# Patient Record
Sex: FEMALE | Race: WHITE | NOT HISPANIC OR LATINO | Employment: FULL TIME | URBAN - METROPOLITAN AREA
[De-identification: names, ages, dates, MRNs, and addresses within clinical notes are randomized per-mention and may not be internally consistent; named-entity substitution may affect disease eponyms.]

---

## 2020-07-10 ENCOUNTER — LAB VISIT (OUTPATIENT)
Dept: PRIMARY CARE CLINIC | Facility: OTHER | Age: 30
End: 2020-07-10
Attending: INTERNAL MEDICINE
Payer: MEDICAID

## 2020-07-10 DIAGNOSIS — Z03.818 ENCOUNTER FOR OBSERVATION FOR SUSPECTED EXPOSURE TO OTHER BIOLOGICAL AGENTS RULED OUT: ICD-10-CM

## 2020-07-10 DIAGNOSIS — R51.9 HEAD ACHE: ICD-10-CM

## 2020-07-10 PROCEDURE — U0003 INFECTIOUS AGENT DETECTION BY NUCLEIC ACID (DNA OR RNA); SEVERE ACUTE RESPIRATORY SYNDROME CORONAVIRUS 2 (SARS-COV-2) (CORONAVIRUS DISEASE [COVID-19]), AMPLIFIED PROBE TECHNIQUE, MAKING USE OF HIGH THROUGHPUT TECHNOLOGIES AS DESCRIBED BY CMS-2020-01-R: HCPCS

## 2020-07-13 LAB — SARS-COV-2 RNA RESP QL NAA+PROBE: NOT DETECTED

## 2020-09-02 ENCOUNTER — OFFICE VISIT (OUTPATIENT)
Dept: URGENT CARE | Facility: CLINIC | Age: 30
End: 2020-09-02
Payer: MEDICAID

## 2020-09-02 VITALS
TEMPERATURE: 97 F | RESPIRATION RATE: 16 BRPM | BODY MASS INDEX: 24.63 KG/M2 | SYSTOLIC BLOOD PRESSURE: 108 MMHG | DIASTOLIC BLOOD PRESSURE: 76 MMHG | OXYGEN SATURATION: 99 % | WEIGHT: 139 LBS | HEIGHT: 63 IN | HEART RATE: 79 BPM

## 2020-09-02 DIAGNOSIS — R05.9 COUGH: Primary | ICD-10-CM

## 2020-09-02 LAB
CTP QC/QA: YES
SARS-COV-2 RDRP RESP QL NAA+PROBE: NEGATIVE

## 2020-09-02 PROCEDURE — 99203 PR OFFICE/OUTPT VISIT, NEW, LEVL III, 30-44 MIN: ICD-10-PCS | Mod: S$GLB,,, | Performed by: FAMILY MEDICINE

## 2020-09-02 PROCEDURE — U0002: ICD-10-PCS | Mod: QW,S$GLB,, | Performed by: FAMILY MEDICINE

## 2020-09-02 PROCEDURE — 99203 OFFICE O/P NEW LOW 30 MIN: CPT | Mod: S$GLB,,, | Performed by: FAMILY MEDICINE

## 2020-09-02 PROCEDURE — U0002 COVID-19 LAB TEST NON-CDC: HCPCS | Mod: QW,S$GLB,, | Performed by: FAMILY MEDICINE

## 2020-09-02 NOTE — PROGRESS NOTES
"Subjective:       Patient ID: Estrellita Garcia is a 29 y.o. female.    Vitals:  height is 5' 3" (1.6 m) and weight is 63 kg (139 lb). Her oral temperature is 97.4 °F (36.3 °C). Her blood pressure is 108/76 and her pulse is 79. Her respiration is 16 and oxygen saturation is 99%.     Chief Complaint: Sinus Problem    Headaches, chest congestion, weird taste in her mouth, and fatigue for 1 week. Not taking OTC meds.    Sinus Problem  This is a new problem. The current episode started in the past 7 days. There has been no fever. She is experiencing no pain. Associated symptoms include congestion, headaches, sinus pressure and sneezing. Pertinent negatives include no chills, coughing, diaphoresis, ear pain, hoarse voice, neck pain, shortness of breath, sore throat or swollen glands. Past treatments include nothing. The treatment provided no relief.       Constitution: Negative for chills, sweating, fatigue and fever.   HENT: Positive for congestion and sinus pressure. Negative for ear pain and sore throat.    Neck: Negative for neck pain and painful lymph nodes.   Cardiovascular: Negative for chest pain and leg swelling.   Eyes: Negative for double vision and blurred vision.   Respiratory: Negative for cough and shortness of breath.    Gastrointestinal: Negative for nausea, vomiting and diarrhea.   Genitourinary: Negative for dysuria, frequency, urgency and history of kidney stones.   Musculoskeletal: Negative for joint pain, joint swelling, muscle cramps and muscle ache.   Skin: Negative for color change, pale, rash and bruising.   Allergic/Immunologic: Positive for sneezing. Negative for seasonal allergies.   Neurological: Positive for headaches. Negative for dizziness, history of vertigo, light-headedness and passing out.   Hematologic/Lymphatic: Negative for swollen lymph nodes.   Psychiatric/Behavioral: Negative for nervous/anxious, sleep disturbance and depression. The patient is not nervous/anxious.        Objective: "      Physical Exam   Constitutional: She is oriented to person, place, and time. She appears well-developed.   HENT:   Head: Normocephalic and atraumatic.   Ears:   Right Ear: External ear normal.   Left Ear: External ear normal.   Nose: Nose normal.   Mouth/Throat: Oropharynx is clear and moist.   Eyes: Pupils are equal, round, and reactive to light. Conjunctivae, EOM and lids are normal.   Neck: Trachea normal, full passive range of motion without pain and phonation normal. Neck supple.   Cardiovascular: Normal rate.   Pulmonary/Chest: Effort normal and breath sounds normal.   Musculoskeletal: Normal range of motion.   Neurological: She is alert and oriented to person, place, and time.   Skin: Skin is warm, dry and intact. Psychiatric: Her speech is normal and behavior is normal. Judgment and thought content normal.   Nursing note and vitals reviewed.        Assessment:       1. Cough        Plan:         Cough  -     POCT COVID-19 Rapid Screening

## 2020-09-02 NOTE — LETTER
September 2, 2020      [facility] [address], [address]  [facility] [address]  Phone: [phone]  Fax: [phone]       Patient: Estrellita Garcia   YOB: 1990  Date of Visit: 09/02/2020    To Whom It May Concern:    Vijay Garcia  was at [facility] on 09/02/2020.Please allow to work from home until 9/11, unless estrellita is feeling better sooner. If you have any questions or concerns, or if I can be of further assistance, please do not hesitate to contact me.    Sincerely,    Floyd Crespo MD

## 2021-01-23 ENCOUNTER — OFFICE VISIT (OUTPATIENT)
Dept: URGENT CARE | Facility: CLINIC | Age: 31
End: 2021-01-23
Payer: MEDICAID

## 2021-01-23 VITALS
SYSTOLIC BLOOD PRESSURE: 113 MMHG | HEIGHT: 65 IN | OXYGEN SATURATION: 96 % | TEMPERATURE: 99 F | RESPIRATION RATE: 17 BRPM | HEART RATE: 78 BPM | WEIGHT: 162 LBS | BODY MASS INDEX: 26.99 KG/M2 | DIASTOLIC BLOOD PRESSURE: 73 MMHG

## 2021-01-23 DIAGNOSIS — R05.9 COUGH: Primary | ICD-10-CM

## 2021-01-23 LAB
CTP QC/QA: YES
SARS-COV-2 RDRP RESP QL NAA+PROBE: NEGATIVE

## 2021-01-23 PROCEDURE — U0002: ICD-10-PCS | Mod: QW,S$GLB,, | Performed by: FAMILY MEDICINE

## 2021-01-23 PROCEDURE — 99214 PR OFFICE/OUTPT VISIT, EST, LEVL IV, 30-39 MIN: ICD-10-PCS | Mod: S$GLB,,, | Performed by: FAMILY MEDICINE

## 2021-01-23 PROCEDURE — U0002 COVID-19 LAB TEST NON-CDC: HCPCS | Mod: QW,S$GLB,, | Performed by: FAMILY MEDICINE

## 2021-01-23 PROCEDURE — 99214 OFFICE O/P EST MOD 30 MIN: CPT | Mod: S$GLB,,, | Performed by: FAMILY MEDICINE

## 2022-01-06 ENCOUNTER — OFFICE VISIT (OUTPATIENT)
Dept: URGENT CARE | Facility: CLINIC | Age: 32
End: 2022-01-06
Payer: MEDICAID

## 2022-01-06 VITALS
SYSTOLIC BLOOD PRESSURE: 108 MMHG | TEMPERATURE: 98 F | HEIGHT: 65 IN | OXYGEN SATURATION: 97 % | RESPIRATION RATE: 16 BRPM | BODY MASS INDEX: 29.99 KG/M2 | WEIGHT: 180 LBS | HEART RATE: 86 BPM | DIASTOLIC BLOOD PRESSURE: 75 MMHG

## 2022-01-06 DIAGNOSIS — R05.9 COUGH: Primary | ICD-10-CM

## 2022-01-06 DIAGNOSIS — U07.1 COVID-19: ICD-10-CM

## 2022-01-06 LAB
CTP QC/QA: YES
SARS-COV-2 RDRP RESP QL NAA+PROBE: POSITIVE

## 2022-01-06 PROCEDURE — 1159F PR MEDICATION LIST DOCUMENTED IN MEDICAL RECORD: ICD-10-PCS | Mod: CPTII,S$GLB,, | Performed by: EMERGENCY MEDICINE

## 2022-01-06 PROCEDURE — 3074F PR MOST RECENT SYSTOLIC BLOOD PRESSURE < 130 MM HG: ICD-10-PCS | Mod: CPTII,S$GLB,, | Performed by: EMERGENCY MEDICINE

## 2022-01-06 PROCEDURE — 3008F PR BODY MASS INDEX (BMI) DOCUMENTED: ICD-10-PCS | Mod: CPTII,S$GLB,, | Performed by: EMERGENCY MEDICINE

## 2022-01-06 PROCEDURE — 99214 PR OFFICE/OUTPT VISIT, EST, LEVL IV, 30-39 MIN: ICD-10-PCS | Mod: S$GLB,,, | Performed by: EMERGENCY MEDICINE

## 2022-01-06 PROCEDURE — 1160F RVW MEDS BY RX/DR IN RCRD: CPT | Mod: CPTII,S$GLB,, | Performed by: EMERGENCY MEDICINE

## 2022-01-06 PROCEDURE — U0002 COVID-19 LAB TEST NON-CDC: HCPCS | Mod: QW,CR,S$GLB, | Performed by: EMERGENCY MEDICINE

## 2022-01-06 PROCEDURE — 99214 OFFICE O/P EST MOD 30 MIN: CPT | Mod: S$GLB,,, | Performed by: EMERGENCY MEDICINE

## 2022-01-06 PROCEDURE — 3008F BODY MASS INDEX DOCD: CPT | Mod: CPTII,S$GLB,, | Performed by: EMERGENCY MEDICINE

## 2022-01-06 PROCEDURE — 3074F SYST BP LT 130 MM HG: CPT | Mod: CPTII,S$GLB,, | Performed by: EMERGENCY MEDICINE

## 2022-01-06 PROCEDURE — U0002: ICD-10-PCS | Mod: QW,CR,S$GLB, | Performed by: EMERGENCY MEDICINE

## 2022-01-06 PROCEDURE — 3078F PR MOST RECENT DIASTOLIC BLOOD PRESSURE < 80 MM HG: ICD-10-PCS | Mod: CPTII,S$GLB,, | Performed by: EMERGENCY MEDICINE

## 2022-01-06 PROCEDURE — 1159F MED LIST DOCD IN RCRD: CPT | Mod: CPTII,S$GLB,, | Performed by: EMERGENCY MEDICINE

## 2022-01-06 PROCEDURE — 3078F DIAST BP <80 MM HG: CPT | Mod: CPTII,S$GLB,, | Performed by: EMERGENCY MEDICINE

## 2022-01-06 PROCEDURE — 1160F PR REVIEW ALL MEDS BY PRESCRIBER/CLIN PHARMACIST DOCUMENTED: ICD-10-PCS | Mod: CPTII,S$GLB,, | Performed by: EMERGENCY MEDICINE

## 2022-01-06 RX ORDER — ALBUTEROL SULFATE 90 UG/1
2 AEROSOL, METERED RESPIRATORY (INHALATION) EVERY 6 HOURS PRN
Qty: 18 G | Refills: 0 | Status: SHIPPED | OUTPATIENT
Start: 2022-01-06 | End: 2022-09-12

## 2022-01-06 RX ORDER — ESCITALOPRAM OXALATE 10 MG/1
10 TABLET ORAL DAILY
COMMUNITY
End: 2022-09-12

## 2022-01-06 RX ORDER — ONDANSETRON 4 MG/1
4 TABLET, ORALLY DISINTEGRATING ORAL EVERY 6 HOURS PRN
Qty: 12 TABLET | Refills: 0 | Status: SHIPPED | OUTPATIENT
Start: 2022-01-06 | End: 2022-09-12

## 2022-01-06 RX ORDER — PROMETHAZINE HYDROCHLORIDE AND DEXTROMETHORPHAN HYDROBROMIDE 6.25; 15 MG/5ML; MG/5ML
5 SYRUP ORAL EVERY 4 HOURS PRN
Qty: 180 ML | Refills: 0 | Status: SHIPPED | OUTPATIENT
Start: 2022-01-06 | End: 2022-01-16

## 2022-01-06 NOTE — PROGRESS NOTES
"Subjective:       Patient ID: Estrellita Garcia is a 31 y.o. female.    Vitals:  height is 5' 5" (1.651 m) and weight is 81.6 kg (180 lb). Her temperature is 97.9 °F (36.6 °C). Her blood pressure is 108/75 and her pulse is 86. Her respiration is 16 and oxygen saturation is 97%.     Chief Complaint: Sore Throat    Patient presents today with complaints of Sore Throat, Fever 101.5 oral & productive cough with brown/yellow sputrum   X 4 days.  Patient has taken OTC tylenol for fever with moderate relief. Patient is covid vaccinated.     Sore Throat   This is a new problem. The current episode started in the past 7 days. The problem has been unchanged. The maximum temperature recorded prior to her arrival was 101 - 101.9 F. Associated symptoms include coughing.       HENT: Positive for sore throat.    Respiratory: Positive for cough.        Objective:      Physical Exam   Constitutional: She is oriented to person, place, and time. She appears well-developed and well-nourished. She is cooperative.  Non-toxic appearance. She does not have a sickly appearance. She does not appear ill. No distress.   HENT:   Head: Normocephalic and atraumatic.   Ears:   Right Ear: Hearing and external ear normal.   Left Ear: Hearing and external ear normal.   Nose: Nose normal. No mucosal edema, rhinorrhea or nasal deformity. No epistaxis. Right sinus exhibits no maxillary sinus tenderness and no frontal sinus tenderness. Left sinus exhibits no maxillary sinus tenderness and no frontal sinus tenderness.   Mouth/Throat: Uvula is midline, oropharynx is clear and moist and mucous membranes are normal. No trismus in the jaw. Normal dentition. No uvula swelling. No oropharyngeal exudate, posterior oropharyngeal edema or posterior oropharyngeal erythema.   Eyes: Conjunctivae and lids are normal. No scleral icterus.   Neck: Trachea normal and phonation normal. Neck supple. No edema present. No erythema present. No neck rigidity present. "   Cardiovascular: Normal rate, regular rhythm, normal heart sounds, intact distal pulses and normal pulses.   Pulmonary/Chest: Effort normal and breath sounds normal. No respiratory distress. She has no decreased breath sounds. She has no rhonchi.    Comments: Very minimal expiratory wheeze with good air movement    Abdominal: Normal appearance.   Musculoskeletal: Normal range of motion.         General: No deformity or edema. Normal range of motion.   Neurological: She is alert and oriented to person, place, and time. She exhibits normal muscle tone. Coordination normal.   Skin: Skin is warm, dry, intact, not diaphoretic and not pale.   Psychiatric: She has a normal mood and affect. Her speech is normal and behavior is normal. Judgment and thought content normal. Cognition and memory  Nursing note and vitals reviewed.    Results for orders placed or performed in visit on 01/06/22   POCT COVID-19 Rapid Screening   Result Value Ref Range    POC Rapid COVID Positive (A) Negative     Acceptable Yes                Assessment:       1. Cough    2. COVID-19          Plan:         Cough  -     POCT COVID-19 Rapid Screening    COVID-19  -     albuterol (VENTOLIN HFA) 90 mcg/actuation inhaler; Inhale 2 puffs into the lungs every 6 (six) hours as needed for Wheezing. Rescue  Dispense: 18 g; Refill: 0  -     ondansetron (ZOFRAN-ODT) 4 MG TbDL; Take 1 tablet (4 mg total) by mouth every 6 (six) hours as needed (Nausea).  Dispense: 12 tablet; Refill: 0    Other orders  -     promethazine-dextromethorphan (PROMETHAZINE-DM) 6.25-15 mg/5 mL Syrp; Take 5 mLs by mouth every 4 (four) hours as needed (cough).  Dispense: 180 mL; Refill: 0

## 2022-01-06 NOTE — PATIENT INSTRUCTIONS
"You have tested positive for COVID-19 today.           ISOLATION    If you tested positive and you have no symptoms, you must isolate for 5 days starting on the day of the positive test. I         If you tested positive and have symptoms, you must isolate for 5 days starting on the day of the first symptoms,  not the day of the positive test.     This is the most important part, both the CDC and the [facility] emphasize that you do not test out of isolation.     After 5 days, if your symptoms have improved and you have not had fever on day 5, you can return to the community on day 6- NO TESTING REQUIRED!          In fact, we do not retest if you were positive in the last 90 days.       After your 5 days of isolation are completed, the CDC recommends strict mask use for the first 5 days that you come out of isolation.    Patient encouraged to see a provider today for further assessment or treatment options. "Patient Declined/Patient would like a phone call for following"  Patient will/will not be contacted by Provider for following information below   Your test is positive, scientists at [facility] would like to collect another sample from you to SEQUENCE for one of the COVID variants. We wont be able to give you the results, but it will help researchers understand whether vaccines are effective against new strains.     Patient will/will not be contacted by Provider Monoclonal antibody infusion therapy         CDC Testing and Quarantine Guidelines for patients with exposure to a known-positive COVID-19 person:    ·     A 'close exposure' is defined as anyone who has had an exposure (masked or unmasked) to a known COVID -19 positive person            within 6 feet of someone            for a cumulative total of 15 minutes or more over a 24-hour period.    ·     vaccinated and/or had a positive test within 90 days            do NOT need to quarantine after contact with someone who had COVID-19 unless they have " symptoms.            fully vaccinated people who have not had a positive test within 90 days, should get tested 3-5 days after their exposure, even if they don't have symptoms and wear a mask indoors in public for 10 days following exposure or until their test result is negative.         ·     Unvaccinated, or are more than six months out from their second mRNA dose (or more than 2 months after the J&J vaccine) and not yet boosted,  and/or NOT had a positive test within 90 days and meet 'close exposure'    you are required by CDC guidelines to quarantine for at least 5 days from time of exposure followed by 5 days of strict masking. It is recommended, but not required to test after 5 days, unless you develop symptoms, in which case you should test at that time.    If you do decide to test at 5 days and are asymptomatic, the risk is that if you test without symptoms on Day 5 for example) and you are positive, your 5 day isolation begins on that day, and you turned your 5 day quarantine into 10 days.            If your exposure does not meet the above definition, you can return to your normal daily activities to include social distancing, wearing a mask and frequent handwashing.    Alternatively, if a 5-day quarantine is not feasible, it is imperative that an exposed person wear a well-fitting mask at all times when around others for 10 days after exposure  Patient Education       COVID-19 Discharge Instructions   About this topic   Coronavirus disease 2019 is also known as COVID-19. It is a viral illness that infects the lungs. It is caused by a virus called SARS-associated coronavirus (SARS-CoV-2).  The signs of COVID-19 most often start a few days after you have been infected. In some people, it takes longer to show signs. Others never show signs of the infection. You may have a cough, fever, shaking chills and it may be hard to breathe. You may be very tired, have muscle aches, a headache or sore throat. Some  people have an upset stomach or loose stools. Others lose their sense of smell or taste. You may not have these signs all the time and they may come and go while you are sick.  The virus spreads easily through droplets when you talk, sneeze, or cough. You can pass the virus to others when you are talking close together, singing, hugging, sharing food, or shaking hands. Doctors believe the germs also survive on surfaces like tables, door handles, and telephones. However, this is not a common way that COVID-19 spreads. Doctors believe you can also spread the infection even if you dont have any symptoms, but they do not know how that happens. This is why getting vaccinated is one of the best ways to keep you healthy and slow the spread of the virus.  Some people have a mild case of COVID-19 and are able to stay at home and away from others until they feel better. Others may need to be in the hospital if they are very sick. Some people with COVID-19 can have some symptoms for weeks or months. People with COVID-19 must isolate themselves. You can start to be around others when your doctor says it is safe to do so.       What care is needed at home?   · Ask your doctor what you need to do when you go home. Make sure you ask questions if you do not understand what the doctor says.  · Drink lots of water, juice, or broth to replace fluids lost from a fever.  · You may use cool mist humidifiers to help ease congestion and coughing.  · Use 2 to 3 pillows to prop yourself up when you lie down to make it easier to breathe and sleep.  · Do not smoke and do not drink beer, wine, and mixed drinks (alcohol).  · To lower the chance of passing the infection to others, get a COVID-19 vaccine after your infection has resolved.  · If you have not been fully vaccinated:  ? Wear a mask over your mouth and nose if you are around others who are not sick. Cloth masks work best if they have more than one layer of fabric.  ? Wash your hands  often.  ? Stay home in a separate room, if possible, away from others. Only go out to get medical care.  ? Use a separate bathroom if possible.  ? Do not make food for others.  What follow-up care is needed?   · Your doctor may ask you to make visits to the office to check on your progress. Be sure to keep these visits. Make sure you wear a mask at these visits.  · If you can, tell the staff you have COVID-19 ahead of time so they can take extra care to stop the disease from spreading.  · It may take a few weeks before your health returns to normal.  What drugs may be needed?   The doctor may order drugs to:  · Help with breathing  · Help with fever  · Help with swelling in your airways and lungs  · Control coughing  · Ease a sore throat  · Help a runny or stuffy nose  Will physical activity be limited?   You may have to limit your physical activity. Talk to your doctor about the right amount of activity for you. If you have been very sick with COVID-19, it can take some time to get your strength back.  Will there be any other care needed?   Doctors do not know how long you can pass the virus on to others after you are sick. This is why it is important to stay in a separate room, if possible, when you are sick. For now, doctors are giving general guidelines for you to follow after you have been sick. Before you go around other people, you should:  · Be fever free for 24 hours without taking any drugs to lower the fever  · Have no symptoms of cough or shortness of breath  · Wait at least 10 days after first having symptoms or your first positive test, and you need to be symptom free as above. Some experts suggest waiting 20 days if you have had a more severe infection.  Talk with your doctor about getting a COVID-19 vaccine.  What problems could happen?   · Fluid loss. This is dehydration.  · Short-term or long-term lung damage  · Heart problems  · Death  When do I need to call the doctor?   · You are having so much  trouble breathing that you can only say one or two words at a time.  · You need to sit upright at all times to be able to breathe and/or cannot lie down.  · You are very confused or cannot stay awake.  · Your lips or skin start to turn blue or grey.  · You think you might be having a medical emergency. Some examples of medical emergencies are:  ? Severe chest pain.  ? Not able to speak or move normally.  · You have trouble breathing when talking or sitting still.  · You have new shortness of breath.  · You become weak or dizzy.  · You have very dark urine or do not pass urine for more than 8 hours.  · You have new or worsening COVID-19 symptoms like:  ? Fever  ? Cough  ? Feeling very tired  ? Shaking chills  ? Headache  ? Trouble swallowing  ? Throwing up  ? Loose stools  ? Reddish purple spots on your fingers or toes  Teach Back: Helping You Understand   The Teach Back Method helps you understand the information we are giving you. After you talk with the staff, tell them in your own words what you learned. This helps to make sure the staff has described each thing clearly. It also helps to explain things that may have been confusing. Before going home, make sure you can do these:  · I can tell you about my condition.  · I can tell you what may help ease my breathing.  · I can tell you what I can do to help avoid passing the infection to others.  · I can tell you what I will do if I have trouble breathing; feel sleepy or confused; or my fingertips, fingernails, skin, or lips are blue.  Where can I learn more?   Centers for Disease Control and Prevention  https://www.cdc.gov/coronavirus/2019-ncov/about/index.html   Centers for Disease Control and Prevention  https://www.cdc.gov/coronavirus/2019-ncov/hcp/disposition-in-home-patients.html   World Health Organization  https://www.who.int/news-room/q-a-detail/d-b-mxrnrvvqwxtas   Last Reviewed Date   2021-10-05  Consumer Information Use and Disclaimer   This information  is not specific medical advice and does not replace information you receive from your health care provider. This is only a brief summary of general information. It does NOT include all information about conditions, illnesses, injuries, tests, procedures, treatments, therapies, discharge instructions or life-style choices that may apply to you. You must talk with your health care provider for complete information about your health and treatment options. This information should not be used to decide whether or not to accept your health care providers advice, instructions or recommendations. Only your health care provider has the knowledge and training to provide advice that is right for you.  Copyright   Copyright © 2021 [employer] and its affiliates and/or licensors. All rights reserved.

## 2023-02-20 ENCOUNTER — NURSE TRIAGE (OUTPATIENT)
Dept: ADMINISTRATIVE | Facility: CLINIC | Age: 33
End: 2023-02-20
Payer: MEDICAID

## 2023-02-20 NOTE — TELEPHONE ENCOUNTER
Patient is calling, was seen in ED for fracture of ankle. [facility] was given referral to ortho but they do not take medicaid. She spoke to [facility] Medicaid office and [facility] was given a list to call and no one will except Medicaid. Discussed option of calling [facility]. Verb understanding. Also states pain medication is no working, advised to return to ED if no pain relief. No further questions or concerns at this time.   Reason for Disposition   Caller requesting an appointment, triage offered and declined    Protocols used: PCP Call - No Triage-A-

## 2023-02-21 ENCOUNTER — NURSE TRIAGE (OUTPATIENT)
Dept: ADMINISTRATIVE | Facility: CLINIC | Age: 33
End: 2023-02-21
Payer: MEDICAID

## 2023-02-21 NOTE — TELEPHONE ENCOUNTER
[address], [address]  [facility] [address]  Phone: [phone]  Fax: [phone]    January 9, 2019        Dago Dangelo  [address] [facility] [address]          To whom it may concern:    RE: Dago Dangelo    Patient was seen and treated today at our clinic.  Please excuse him from work on 1/8-1/10/2019.          Sincerely,        OCTAVIO Lockwood CNP   Estrellita calling states she broke both sides of her ankle over the weekend. Splint to RLE and on crutches. states pain is severe & now having pain in mid calf under splint. States no relief after taking prescribed Hydrocodone and  mg as instructed. Advised per triage protocol to go to nearest ED within next 4 hours for physician claudette. V/u.   Reason for Disposition   [1] SEVERE pain AND [2] not improved one hour after pain medicine, elevation, and loosening elastic bandage or Velcro    Additional Information   Negative: Chest pain   Negative: Difficulty breathing   Negative: [1] Numbness (loss of sensation) of fingers or toes AND [2] persists > 1 hour after loosening elastic bandage or Velcro   Negative: [1] Tingling (pins and needles sensation) of fingers or toes AND [2] persists > 1 hour after loosening elastic bandage or Velcro   Negative: [1] Blueness or pallor of fingers or toes (compared to noninjured side) AND [2] persists > 1 hour after loosening elastic bandage or Velcro   Negative: Patient sounds very sick or weak to the triager    Protocols used: Splint Symptoms and [id number]

## 2023-02-22 ENCOUNTER — OFFICE VISIT (OUTPATIENT)
Dept: ORTHOPEDICS | Facility: CLINIC | Age: 33
End: 2023-02-22
Payer: MEDICAID

## 2023-02-22 ENCOUNTER — HOSPITAL ENCOUNTER (OUTPATIENT)
Dept: RADIOLOGY | Facility: HOSPITAL | Age: 33
Discharge: HOME | End: 2023-02-22
Attending: ORTHOPAEDIC SURGERY
Payer: MEDICAID

## 2023-02-22 ENCOUNTER — TELEPHONE (OUTPATIENT)
Dept: ORTHOPEDICS | Facility: CLINIC | Age: 33
End: 2023-02-22
Payer: MEDICAID

## 2023-02-22 DIAGNOSIS — S93.04XA ANKLE DISLOCATION, RIGHT, INITIAL ENCOUNTER: ICD-10-CM

## 2023-02-22 DIAGNOSIS — S82.851A DISPLACED TRIMALLEOLAR FRACTURE OF LOWER LEG, RIGHT, CLOSED, INITIAL ENCOUNTER: ICD-10-CM

## 2023-02-22 DIAGNOSIS — M25.571 RIGHT ANKLE PAIN, UNSPECIFIED CHRONICITY: ICD-10-CM

## 2023-02-22 DIAGNOSIS — S82.851A CLOSED DISPLACED TRIMALLEOLAR FRACTURE OF RIGHT ANKLE, INITIAL ENCOUNTER: Primary | ICD-10-CM

## 2023-02-22 DIAGNOSIS — M25.571 RIGHT ANKLE PAIN, UNSPECIFIED CHRONICITY: Primary | ICD-10-CM

## 2023-02-22 PROCEDURE — 99213 OFFICE O/P EST LOW 20 MIN: CPT | Mod: PBBFAC,PN | Performed by: ORTHOPAEDIC SURGERY

## 2023-02-22 PROCEDURE — 73610 X-RAY EXAM OF ANKLE: CPT | Mod: TC,PO,RT

## 2023-02-22 PROCEDURE — 99999 PR PBB SHADOW E&M-EST. PATIENT-LVL III: CPT | Mod: PBBFAC,,, | Performed by: ORTHOPAEDIC SURGERY

## 2023-02-22 PROCEDURE — 99999 PR PBB SHADOW E&M-EST. PATIENT-LVL III: ICD-10-PCS | Mod: PBBFAC,,, | Performed by: ORTHOPAEDIC SURGERY

## 2023-02-22 PROCEDURE — 99203 OFFICE O/P NEW LOW 30 MIN: CPT | Mod: S$PBB,57,, | Performed by: ORTHOPAEDIC SURGERY

## 2023-02-22 PROCEDURE — 99203 PR OFFICE/OUTPT VISIT, NEW, LEVL III, 30-44 MIN: ICD-10-PCS | Mod: S$PBB,57,, | Performed by: ORTHOPAEDIC SURGERY

## 2023-02-22 PROCEDURE — 73610 X-RAY EXAM OF ANKLE: CPT | Mod: 26,RT,, | Performed by: RADIOLOGY

## 2023-02-22 PROCEDURE — 73610 XR ANKLE COMPLETE 3 VIEW RIGHT: ICD-10-PCS | Mod: 26,RT,, | Performed by: RADIOLOGY

## 2023-02-22 RX ORDER — ALPRAZOLAM 0.25 MG/1
0.25 TABLET ORAL DAILY PRN
COMMUNITY
Start: 2023-01-26

## 2023-02-22 RX ORDER — ESCITALOPRAM OXALATE 20 MG/1
20 TABLET ORAL NIGHTLY
COMMUNITY

## 2023-02-22 RX ORDER — DEXTROAMPHETAMINE SACCHARATE, AMPHETAMINE ASPARTATE, DEXTROAMPHETAMINE SULFATE AND AMPHETAMINE SULFATE 3.75; 3.75; 3.75; 3.75 MG/1; MG/1; MG/1; MG/1
15 TABLET ORAL 2 TIMES DAILY
COMMUNITY
Start: 2023-01-26

## 2023-02-22 NOTE — PROGRESS NOTES
Status/Diagnosis: Right trimalleolar ankle fracture dislocation  Date of Surgery: none  Date of Injury: 02/19/2023  Return visit: 02/28/2023  X-rays on Return: NWB 3-views Right ankle (IN plaster)    Chief Complaint:   Chief Complaint   Patient presents with    Right Ankle - Pain     Present History:  Estrellita Garcia is a 32 y.o. female who presents today with her mother for new patient evaluation.  Reports she got home from the bar early Sunday morning when she rolled her right ankle and sustained a fall.  Immediate pain, swelling, inability bear weight.  Subsequently seen at [facility] ED at which time x-rays were taken and consistent with fracture.  She was placed into a short-leg splint with instructions for nonweightbearing.  Reports she actually was seen at a local urgent care the following day due to increased pain/tightness.  Reports that her splint was exchanged with moderate symptomatic improvement.  Endorses 10/10 pain on presentation today.  Denies any ankle pain or instability prior to injury.  Denies any numbness or tingling.    Vague history of heart arrhythmia, on atenolol.  Managed by Dr. Holcomb.  Vapes regularly with nicotine.  Works from home.      No past medical history on file.    Past Surgical History:   Procedure Laterality Date    uterine polyps         Current Outpatient Medications   Medication Sig    ALPRAZolam (XANAX) 0.25 MG tablet Take 0.25 mg by mouth daily as needed.    atenoloL (TENORMIN) 25 MG tablet Take 1 tablet (25 mg total) by mouth once daily.    dextroamphetamine-amphetamine (ADDERALL) 15 mg tablet Take 15 mg by mouth 2 (two) times daily.    EScitalopram oxalate (LEXAPRO) 20 MG tablet Take 20 mg by mouth once daily. Takes 30mg    HYDROcodone-acetaminophen (NORCO) 7.5-325 mg per tablet Take 1 tablet by mouth every 8 (eight) hours as needed for Pain.     No current facility-administered medications for this visit.       Review of patient's allergies indicates:  No Known  Allergies    Family History   Problem Relation Age of Onset    No Known Problems Mother     No Known Problems Father        Social History     Socioeconomic History    Marital status: Single   Tobacco Use    Smoking status: Some Days     Types: Cigarettes    Smokeless tobacco: Never   Substance and Sexual Activity    Alcohol use: Yes     Comment: socially    Drug use: Never    Sexual activity: Yes       Physical exam:  There were no vitals filed for this visit.  There is no height or weight on file to calculate BMI.  General: In no apparent distress; well developed and well nourished.  HEENT: normocephalic; atraumatic.  Cardiovascular: regular rate.  Respiratory: no increased work of breathing.  Musculoskeletal:   Gait: unable to assess  Inspection:  Diffuse swelling about the right ankle.  Superficial abrasion involving the anteromedial aspect of the ankle measuring approximately 1.5 x 3 cm.  No deep extension.  No drainage.  There are 2 small areas of fracture blistering on the posterior margin of the superficial abrasion.  Diffusely tender to palpation.  For ankle range of motion secondary to pain.  No pain referable to the foot.  No pain/laxity with Lisfranc stress.  Silfverskiold: unable to assess  Alignment:  Knee: neutral               Ankle: neutral              Hindfoot: neutral              Forefoot: neutral   Strength:              Dorsiflexion 5/5  Plantar flexion 5/5  Inversion 5/5   Eversion 5/5   Sensation:              SILT distally   Pulses: 2+ DP/PT pulses.                   Imaging Studies/Outside documentation:  I have ordered/reviewed/interpreted the following images/outside documentation:  1. NWB 3-views Right ankle:  Significantly displaced trimalleolar ankle fracture with lateral subluxation of the talus relative to the tibial plafond.  Clear space widening present medially. Long spiral fracture of the lateral malleolus.  Comminuted medial malleolus fracture.  Avulsion of the posterior  malleolus.  No significant degenerative joint changes.        Assessment:  Estrellita Garcia is a 32 y.o. female with Right trimalleolar ankle fracture dislocation.     Plan:   Clinical and radiographic findings were discussed. Operative vs nonoperative treatment options were described. These include but are not limited to bleeding; infection; damage to surrounding nerves or vessels; persistent pain, stiffness; nonunion; malunion; recurrent deformity; need for additional procedures; amputation; blood clots; pulmonary embolus; cardiac events; stroke; and the general risks of anesthesia including anesthetic death.   We also reviewed postop protocol as well as limitations and expectations.   Specifically discussed with the patient and her mother the need for additional procedures.  This would be stage one of a two-stage process.  With need for definitive ORIF once soft tissue swelling allows.  Patient understands and desires to proceed with surgical intervention.   Explained risks, benefits, and alternative to the patient. Asked if any questions--none.  Surgery to include but not limited to:   Right ankle closed reduction.  Application of short-leg splint versus ankle spanning external fixator placement; surgery as indicated.        A Betadine dressing was placed in the area of superficial abrasion as outlined above.  A new well-padded short-leg plaster splint was placed.  Patient given instructions for strict elevation.  To OR tomorrow morning for the above noted procedure.    This note was created using voice recognition software and may contain grammatical errors.

## 2023-02-22 NOTE — H&P (VIEW-ONLY)
Status/Diagnosis: Right trimalleolar ankle fracture dislocation  Date of Surgery: none  Date of Injury: 02/19/2023  Return visit: 02/28/2023  X-rays on Return: NWB 3-views Right ankle (IN plaster)    Chief Complaint:   Chief Complaint   Patient presents with    Right Ankle - Pain     Present History:  Estrellita Garcia is a 32 y.o. female who presents today with her mother for new patient evaluation.  Reports she got home from the bar early Sunday morning when she rolled her right ankle and sustained a fall.  Immediate pain, swelling, inability bear weight.  Subsequently seen at [facility] ED at which time x-rays were taken and consistent with fracture.  She was placed into a short-leg splint with instructions for nonweightbearing.  Reports she actually was seen at a local urgent care the following day due to increased pain/tightness.  Reports that her splint was exchanged with moderate symptomatic improvement.  Endorses 10/10 pain on presentation today.  Denies any ankle pain or instability prior to injury.  Denies any numbness or tingling.    Vague history of heart arrhythmia, on atenolol.  Managed by Dr. Holcomb.  Vapes regularly with nicotine.  Works from home.      No past medical history on file.    Past Surgical History:   Procedure Laterality Date    uterine polyps         Current Outpatient Medications   Medication Sig    ALPRAZolam (XANAX) 0.25 MG tablet Take 0.25 mg by mouth daily as needed.    atenoloL (TENORMIN) 25 MG tablet Take 1 tablet (25 mg total) by mouth once daily.    dextroamphetamine-amphetamine (ADDERALL) 15 mg tablet Take 15 mg by mouth 2 (two) times daily.    EScitalopram oxalate (LEXAPRO) 20 MG tablet Take 20 mg by mouth once daily. Takes 30mg    HYDROcodone-acetaminophen (NORCO) 7.5-325 mg per tablet Take 1 tablet by mouth every 8 (eight) hours as needed for Pain.     No current facility-administered medications for this visit.       Review of patient's allergies indicates:  No Known  Allergies    Family History   Problem Relation Age of Onset    No Known Problems Mother     No Known Problems Father        Social History     Socioeconomic History    Marital status: Single   Tobacco Use    Smoking status: Some Days     Types: Cigarettes    Smokeless tobacco: Never   Substance and Sexual Activity    Alcohol use: Yes     Comment: socially    Drug use: Never    Sexual activity: Yes       Physical exam:  There were no vitals filed for this visit.  There is no height or weight on file to calculate BMI.  General: In no apparent distress; well developed and well nourished.  HEENT: normocephalic; atraumatic.  Cardiovascular: regular rate.  Respiratory: no increased work of breathing.  Musculoskeletal:   Gait: unable to assess  Inspection:  Diffuse swelling about the right ankle.  Superficial abrasion involving the anteromedial aspect of the ankle measuring approximately 1.5 x 3 cm.  No deep extension.  No drainage.  There are 2 small areas of fracture blistering on the posterior margin of the superficial abrasion.  Diffusely tender to palpation.  For ankle range of motion secondary to pain.  No pain referable to the foot.  No pain/laxity with Lisfranc stress.  Silfverskiold: unable to assess  Alignment:  Knee: neutral               Ankle: neutral              Hindfoot: neutral              Forefoot: neutral   Strength:              Dorsiflexion 5/5  Plantar flexion 5/5  Inversion 5/5   Eversion 5/5   Sensation:              SILT distally   Pulses: 2+ DP/PT pulses.                   Imaging Studies/Outside documentation:  I have ordered/reviewed/interpreted the following images/outside documentation:  1. NWB 3-views Right ankle:  Significantly displaced trimalleolar ankle fracture with lateral subluxation of the talus relative to the tibial plafond.  Clear space widening present medially. Long spiral fracture of the lateral malleolus.  Comminuted medial malleolus fracture.  Avulsion of the posterior  malleolus.  No significant degenerative joint changes.        Assessment:  Estrellita Garcia is a 32 y.o. female with Right trimalleolar ankle fracture dislocation.     Plan:   Clinical and radiographic findings were discussed. Operative vs nonoperative treatment options were described. These include but are not limited to bleeding; infection; damage to surrounding nerves or vessels; persistent pain, stiffness; nonunion; malunion; recurrent deformity; need for additional procedures; amputation; blood clots; pulmonary embolus; cardiac events; stroke; and the general risks of anesthesia including anesthetic death.   We also reviewed postop protocol as well as limitations and expectations.   Specifically discussed with the patient and her mother the need for additional procedures.  This would be stage one of a two-stage process.  With need for definitive ORIF once soft tissue swelling allows.  Patient understands and desires to proceed with surgical intervention.   Explained risks, benefits, and alternative to the patient. Asked if any questions--none.  Surgery to include but not limited to:   Right ankle closed reduction.  Application of short-leg splint versus ankle spanning external fixator placement; surgery as indicated.        A Betadine dressing was placed in the area of superficial abrasion as outlined above.  A new well-padded short-leg plaster splint was placed.  Patient given instructions for strict elevation.  To OR tomorrow morning for the above noted procedure.    This note was created using voice recognition software and may contain grammatical errors.

## 2023-02-22 NOTE — TELEPHONE ENCOUNTER
Per dr pope see if pt can come in today or tomorrow.  I called pt with no answer, will try later.  Attempted calling pt at 9:32am-no answer

## 2023-02-23 ENCOUNTER — HOSPITAL ENCOUNTER (OUTPATIENT)
Facility: HOSPITAL | Age: 33
Discharge: HOME | End: 2023-02-23
Attending: ORTHOPAEDIC SURGERY | Admitting: ORTHOPAEDIC SURGERY
Payer: MEDICAID

## 2023-02-23 ENCOUNTER — ANESTHESIA (OUTPATIENT)
Dept: SURGERY | Facility: HOSPITAL | Age: 33
End: 2023-02-23
Payer: MEDICAID

## 2023-02-23 ENCOUNTER — ANESTHESIA EVENT (OUTPATIENT)
Dept: SURGERY | Facility: HOSPITAL | Age: 33
End: 2023-02-23
Payer: MEDICAID

## 2023-02-23 ENCOUNTER — TELEPHONE (OUTPATIENT)
Dept: ORTHOPEDICS | Facility: CLINIC | Age: 33
End: 2023-02-23
Payer: MEDICAID

## 2023-02-23 ENCOUNTER — HOSPITAL ENCOUNTER (OUTPATIENT)
Dept: RADIOLOGY | Facility: HOSPITAL | Age: 33
Discharge: HOME | End: 2023-02-23
Attending: ORTHOPAEDIC SURGERY | Admitting: ORTHOPAEDIC SURGERY
Payer: MEDICAID

## 2023-02-23 VITALS
SYSTOLIC BLOOD PRESSURE: 108 MMHG | BODY MASS INDEX: 33.13 KG/M2 | RESPIRATION RATE: 13 BRPM | HEIGHT: 62 IN | TEMPERATURE: 98 F | OXYGEN SATURATION: 97 % | WEIGHT: 180 LBS | DIASTOLIC BLOOD PRESSURE: 59 MMHG | HEART RATE: 69 BPM

## 2023-02-23 DIAGNOSIS — S82.851A DISPLACED TRIMALLEOLAR FRACTURE OF LOWER LEG, RIGHT, CLOSED, INITIAL ENCOUNTER: ICD-10-CM

## 2023-02-23 DIAGNOSIS — S93.04XA ANKLE DISLOCATION, RIGHT, INITIAL ENCOUNTER: ICD-10-CM

## 2023-02-23 DIAGNOSIS — S82.891A FX ANKLE, RIGHT, CLOSED, INITIAL ENCOUNTER: ICD-10-CM

## 2023-02-23 LAB
B-HCG UR QL: NEGATIVE
CTP QC/QA: YES

## 2023-02-23 PROCEDURE — 36000704 HC OR TIME LEV I 1ST 15 MIN: Mod: PO | Performed by: ORTHOPAEDIC SURGERY

## 2023-02-23 PROCEDURE — 01462 ANES CLSD PX LOWER L/A/F: CPT | Mod: PO | Performed by: ORTHOPAEDIC SURGERY

## 2023-02-23 PROCEDURE — 63600175 PHARM REV CODE 636 W HCPCS: Mod: PO | Performed by: ANESTHESIOLOGY

## 2023-02-23 PROCEDURE — D9220A PRA ANESTHESIA: ICD-10-PCS | Mod: ANES,,, | Performed by: ANESTHESIOLOGY

## 2023-02-23 PROCEDURE — 25000003 PHARM REV CODE 250: Mod: PO | Performed by: ANESTHESIOLOGY

## 2023-02-23 PROCEDURE — 27842 PR CLOSED RX ANKLE DISLOCATN,ANESTH: ICD-10-PCS | Mod: RT,,, | Performed by: ORTHOPAEDIC SURGERY

## 2023-02-23 PROCEDURE — 27842 TREAT ANKLE DISLOCATION: CPT | Mod: RT,,, | Performed by: ORTHOPAEDIC SURGERY

## 2023-02-23 PROCEDURE — D9220A PRA ANESTHESIA: Mod: CRNA,,, | Performed by: NURSE ANESTHETIST, CERTIFIED REGISTERED

## 2023-02-23 PROCEDURE — 36000705 HC OR TIME LEV I EA ADD 15 MIN: Mod: PO | Performed by: ORTHOPAEDIC SURGERY

## 2023-02-23 PROCEDURE — 37000009 HC ANESTHESIA EA ADD 15 MINS: Mod: PO | Performed by: ORTHOPAEDIC SURGERY

## 2023-02-23 PROCEDURE — 71000016 HC POSTOP RECOV ADDL HR: Mod: PO | Performed by: ORTHOPAEDIC SURGERY

## 2023-02-23 PROCEDURE — 81025 URINE PREGNANCY TEST: CPT | Mod: PO | Performed by: ORTHOPAEDIC SURGERY

## 2023-02-23 PROCEDURE — 37000008 HC ANESTHESIA 1ST 15 MINUTES: Mod: PO | Performed by: ORTHOPAEDIC SURGERY

## 2023-02-23 PROCEDURE — 71000033 HC RECOVERY, INTIAL HOUR: Mod: PO | Performed by: ORTHOPAEDIC SURGERY

## 2023-02-23 PROCEDURE — 25000003 PHARM REV CODE 250: Mod: PO | Performed by: NURSE ANESTHETIST, CERTIFIED REGISTERED

## 2023-02-23 PROCEDURE — D9220A PRA ANESTHESIA: Mod: ANES,,, | Performed by: ANESTHESIOLOGY

## 2023-02-23 PROCEDURE — 63600175 PHARM REV CODE 636 W HCPCS: Mod: PO | Performed by: ORTHOPAEDIC SURGERY

## 2023-02-23 PROCEDURE — 27200651 HC AIRWAY, LMA: Mod: PO | Performed by: ANESTHESIOLOGY

## 2023-02-23 PROCEDURE — 71000015 HC POSTOP RECOV 1ST HR: Mod: PO | Performed by: ORTHOPAEDIC SURGERY

## 2023-02-23 PROCEDURE — D9220A PRA ANESTHESIA: ICD-10-PCS | Mod: CRNA,,, | Performed by: NURSE ANESTHETIST, CERTIFIED REGISTERED

## 2023-02-23 PROCEDURE — 63600175 PHARM REV CODE 636 W HCPCS: Mod: PO | Performed by: NURSE ANESTHETIST, CERTIFIED REGISTERED

## 2023-02-23 PROCEDURE — 76000 FLUOROSCOPY <1 HR PHYS/QHP: CPT | Mod: TC,PO

## 2023-02-23 RX ORDER — MIDAZOLAM HYDROCHLORIDE 1 MG/ML
INJECTION, SOLUTION INTRAMUSCULAR; INTRAVENOUS
Status: DISCONTINUED | OUTPATIENT
Start: 2023-02-23 | End: 2023-02-23

## 2023-02-23 RX ORDER — SODIUM CHLORIDE, SODIUM LACTATE, POTASSIUM CHLORIDE, CALCIUM CHLORIDE 600; 310; 30; 20 MG/100ML; MG/100ML; MG/100ML; MG/100ML
INJECTION, SOLUTION INTRAVENOUS CONTINUOUS
Status: DISCONTINUED | OUTPATIENT
Start: 2023-02-23 | End: 2023-02-23 | Stop reason: HOSPADM

## 2023-02-23 RX ORDER — PROPOFOL 10 MG/ML
VIAL (ML) INTRAVENOUS
Status: DISCONTINUED | OUTPATIENT
Start: 2023-02-23 | End: 2023-02-23

## 2023-02-23 RX ORDER — LIDOCAINE HYDROCHLORIDE 20 MG/ML
INJECTION INTRAVENOUS
Status: DISCONTINUED | OUTPATIENT
Start: 2023-02-23 | End: 2023-02-23

## 2023-02-23 RX ORDER — FENTANYL CITRATE 50 UG/ML
INJECTION, SOLUTION INTRAMUSCULAR; INTRAVENOUS
Status: DISCONTINUED | OUTPATIENT
Start: 2023-02-23 | End: 2023-02-23

## 2023-02-23 RX ORDER — LORAZEPAM 2 MG/ML
0.5 INJECTION INTRAMUSCULAR EVERY 10 MIN PRN
Status: COMPLETED | OUTPATIENT
Start: 2023-02-23 | End: 2023-02-23

## 2023-02-23 RX ORDER — FENTANYL CITRATE 50 UG/ML
25 INJECTION, SOLUTION INTRAMUSCULAR; INTRAVENOUS EVERY 5 MIN PRN
Status: COMPLETED | OUTPATIENT
Start: 2023-02-23 | End: 2023-02-23

## 2023-02-23 RX ORDER — ONDANSETRON 2 MG/ML
INJECTION INTRAMUSCULAR; INTRAVENOUS
Status: DISCONTINUED | OUTPATIENT
Start: 2023-02-23 | End: 2023-02-23

## 2023-02-23 RX ORDER — ACETAMINOPHEN 10 MG/ML
INJECTION, SOLUTION INTRAVENOUS
Status: DISCONTINUED | OUTPATIENT
Start: 2023-02-23 | End: 2023-02-23

## 2023-02-23 RX ORDER — ONDANSETRON HYDROCHLORIDE 8 MG/1
8 TABLET, FILM COATED ORAL EVERY 12 HOURS PRN
Qty: 24 TABLET | Refills: 1 | Status: SHIPPED | OUTPATIENT
Start: 2023-02-23 | End: 2023-03-22

## 2023-02-23 RX ORDER — HYDROMORPHONE HYDROCHLORIDE 2 MG/ML
0.2 INJECTION, SOLUTION INTRAMUSCULAR; INTRAVENOUS; SUBCUTANEOUS EVERY 5 MIN PRN
Status: DISCONTINUED | OUTPATIENT
Start: 2023-02-23 | End: 2023-02-23 | Stop reason: HOSPADM

## 2023-02-23 RX ORDER — OXYCODONE AND ACETAMINOPHEN 5; 325 MG/1; MG/1
1 TABLET ORAL EVERY 6 HOURS PRN
Qty: 24 TABLET | Refills: 0 | Status: ON HOLD | OUTPATIENT
Start: 2023-02-23 | End: 2023-03-01 | Stop reason: HOSPADM

## 2023-02-23 RX ORDER — KETAMINE HCL IN 0.9 % NACL 50 MG/5 ML
SYRINGE (ML) INTRAVENOUS
Status: DISCONTINUED | OUTPATIENT
Start: 2023-02-23 | End: 2023-02-23

## 2023-02-23 RX ORDER — DEXAMETHASONE SODIUM PHOSPHATE 4 MG/ML
INJECTION, SOLUTION INTRA-ARTICULAR; INTRALESIONAL; INTRAMUSCULAR; INTRAVENOUS; SOFT TISSUE
Status: DISCONTINUED | OUTPATIENT
Start: 2023-02-23 | End: 2023-02-23

## 2023-02-23 RX ORDER — OXYCODONE HYDROCHLORIDE 5 MG/1
5 TABLET ORAL
Status: DISCONTINUED | OUTPATIENT
Start: 2023-02-23 | End: 2023-02-23 | Stop reason: HOSPADM

## 2023-02-23 RX ADMIN — LIDOCAINE HYDROCHLORIDE 100 MG: 20 INJECTION INTRAVENOUS at 07:02

## 2023-02-23 RX ADMIN — LORAZEPAM 0.5 MG: 2 INJECTION INTRAMUSCULAR; INTRAVENOUS at 08:02

## 2023-02-23 RX ADMIN — SODIUM CHLORIDE, POTASSIUM CHLORIDE, SODIUM LACTATE AND CALCIUM CHLORIDE: 600; 310; 30; 20 INJECTION, SOLUTION INTRAVENOUS at 07:02

## 2023-02-23 RX ADMIN — FENTANYL CITRATE 50 MCG: 50 INJECTION, SOLUTION INTRAMUSCULAR; INTRAVENOUS at 07:02

## 2023-02-23 RX ADMIN — PROPOFOL 200 MG: 10 INJECTION, EMULSION INTRAVENOUS at 07:02

## 2023-02-23 RX ADMIN — FENTANYL CITRATE 25 MCG: 50 INJECTION, SOLUTION INTRAMUSCULAR; INTRAVENOUS at 08:02

## 2023-02-23 RX ADMIN — OXYCODONE 5 MG: 5 TABLET ORAL at 08:02

## 2023-02-23 RX ADMIN — ACETAMINOPHEN 1000 MG: 10 INJECTION, SOLUTION INTRAVENOUS at 07:02

## 2023-02-23 RX ADMIN — ONDANSETRON 4 MG: 2 INJECTION, SOLUTION INTRAMUSCULAR; INTRAVENOUS at 07:02

## 2023-02-23 RX ADMIN — Medication 25 MG: at 07:02

## 2023-02-23 RX ADMIN — MIDAZOLAM HYDROCHLORIDE 2 MG: 1 INJECTION, SOLUTION INTRAMUSCULAR; INTRAVENOUS at 07:02

## 2023-02-23 RX ADMIN — DEXAMETHASONE SODIUM PHOSPHATE 4 MG: 4 INJECTION, SOLUTION INTRAMUSCULAR; INTRAVENOUS at 07:02

## 2023-02-23 NOTE — TELEPHONE ENCOUNTER
Percocet is only lasting 2-3 hours. Can she take something else in between dosages?   Per dr pope can take tylenol but not to exceed 4000mg in a 24 hours period.   Pt verbalized understanding.

## 2023-02-23 NOTE — OP NOTE
Date of Surgery: 02/23/2023    Pre-Operative Diagnosis:  Right trimalleolar ankle fracture dislocation.    Post-Operative Diagnosis:  Right trimalleolar ankle fracture dislocation.    Procedures:  Closed reduction of ankle fracture dislocation. 82056.    Surgeon: Reinier Riddle MD    Anesthesia: LMA    Estimated Blood Loss: N/A    Drains: None.    Findings: Superficial abrasion over the anteromedial ankle with adjacent fracture blister formation. Mild skin tenting medially pre-reduction. Improved post-reduction.    Implants: none.    Operative Indication:  Estrellita Garcia is a 32 y.o. female who presented to my clinic with a persistent displaced right trimalleolar ankle fracture dislocation.  She had initially been seen in a local ED and then the following day at a local urgent care.  Upon being seen in my clinic on 02/22 it was noted that there was lateral subluxation of the tibiotalar joint.  Patient unable tolerate local anesthetic thus the decision was made to proceed with closed reduction under general anesthesia.    Operative versus non operative treatment options were discussed.  Risks and benefits were described.  These include but are not limited to bleeding; infection; damage to surrounding nerves or vessels; persistent pain, stiffness; nonunion; malunion; need for additional procedures; amputation; blood clots; pulmonary embolus; cardiac events; stroke; and the general risks of anesthesia including anesthetic death.   Patient understands this is stage one of a two-stage process that will include return to OR for definitive ORIF.  We also reviewed postop protocol as well as limitations and expectations. Patient understands and desires to proceed with surgical intervention. Consent was obtained and the right lower leg was marked.    Operative Procedure:  The patient was transferred to the operating room, transferred to the OR table in a supine position then placed under general endotracheal anesthesia by  the anesthesiology service. All bony prominences were appropriately padded.  Patient was secured to the table.   The superficial abrasion and fracture blisters over the anteromedial ankle were pain and with Betadine and a dry gauze dressing was placed.  A Ambrocio maneuver was performed and satisfactory reduction of the tibiotalar joint was obtained.  This was confirmed under fluoroscopy.  Next a well-padded short-leg plaster splint was placed with the ankle at neutral dorsiflexion and supination of the foot to maintain alignment.  Patient was reversed from anesthesia and transferred to the recovery room in stable condition.  As outlined above, patient will require return to OR for stage 2 of 2 with definitive trimalleolar ankle fracture ORIF.      Reinier Riddle MD

## 2023-02-23 NOTE — TELEPHONE ENCOUNTER
----- Message from Aimee Lua, Patient Care Assistant sent at 2/23/2023  4:53 PM CST -----  Contact: self  Pt  need a clarification  on med oxyCODONE-acetaminophen (PERCOCET) 5-325 mg per tablet  Please call back to advise [phone]  thanks

## 2023-02-23 NOTE — ANESTHESIA POSTPROCEDURE EVALUATION
Anesthesia Post Evaluation    Patient: Estrellita Garcia    Procedure(s) Performed: Procedure(s) (LRB):  CLOSED REDUCTION (Right)  APPLICATION, SPLINT (Right)    Final Anesthesia Type: general      Patient location during evaluation: PACU  Patient participation: Yes- Able to Participate  Level of consciousness: awake and alert  Post-procedure vital signs: reviewed and stable  Pain management: adequate  Airway patency: patent    PONV status at discharge: No PONV  Anesthetic complications: no      Cardiovascular status: blood pressure returned to baseline  Respiratory status: unassisted and room air  Hydration status: euvolemic  Follow-up not needed.          Vitals Value Taken Time   /59 02/23/23 0956   Temp 36.8 °C (98.2 °F) 02/23/23 0745   Pulse 69 02/23/23 0956   Resp 13 02/23/23 0956   SpO2 97 % 02/23/23 0956         Event Time   Out of Recovery 08:15:00         Pain/Angus Score: Pain Rating Prior to Med Admin: 5 (2/23/2023  8:40 AM)  Pain Rating Post Med Admin: 3 (2/23/2023 10:00 AM)  Angus Score: 10 (2/23/2023 10:00 AM)

## 2023-02-23 NOTE — DISCHARGE SUMMARY
[facility] - Surgery  Discharge Note  Short Stay    Procedure(s) (LRB):  CLOSED REDUCTION (Right)      OUTCOME: Patient tolerated treatment/procedure well without complication and is now ready for discharge.    DISPOSITION: Home or Self Care    FINAL DIAGNOSIS:  <principal problem not specified>    FOLLOWUP: In clinic    DISCHARGE INSTRUCTIONS:  No discharge procedures on file.     TIME SPENT ON DISCHARGE: 15 minutes

## 2023-02-23 NOTE — ANESTHESIA PREPROCEDURE EVALUATION
02/23/2023  Estrellita Garcia is a 32 y.o., female.      Pre-op Assessment    I have reviewed the Patient Summary Reports.     I have reviewed the Nursing Notes.       Review of Systems  Anesthesia Hx:  No problems with previous Anesthesia    Cardiovascular:   Hypertension        Physical Exam  General: Well nourished        Anesthesia Plan  Type of Anesthesia, risks & benefits discussed:    Anesthesia Type: Gen Natural Airway, Gen Supraglottic Airway, MAC  Intra-op Monitoring Plan: Standard ASA Monitors  Post Op Pain Control Plan: multimodal analgesia and IV/PO Opioids PRN  Induction:  IV  Informed Consent: Informed consent signed with the Patient and all parties understand the risks and agree with anesthesia plan.  All questions answered.   ASA Score: 2  Day of Surgery Review of History & Physical: H&P Update referred to the surgeon/provider.    Ready For Surgery From Anesthesia Perspective.     .

## 2023-02-23 NOTE — ANESTHESIA PROCEDURE NOTES
LMA insertion    Date/Time: 2/23/2023 7:19 AM  Performed by: Ellie Richard CRNA  Authorized by: Trupti Magana MD     Intubation:     Induction:  Intravenous    Intubated:  Postinduction    Mask Ventilation:  N/a    Attempts:  1    Attempted By:  CRNA    Difficult Airway Encountered?: No      Complications:  None    Airway Device:  Supraglottic airway/LMA    Airway Device Size:  4.0    Style/Cuff Inflation:  Cuffed (inflated to minimal occlusive pressure)    Secured at:  The lips    Placement Verified By:  Capnometry    Complicating Factors:  None    Findings Post-Intubation:  BS equal bilateral and atraumatic/condition of teeth unchanged

## 2023-02-23 NOTE — TRANSFER OF CARE
"Anesthesia Transfer of Care Note    Patient: Estrellita Garcia    Procedure(s) Performed: Procedure(s) (LRB):  CLOSED REDUCTION (Right)    Patient location: PACU    Anesthesia Type: general    Transport from OR: Transported from OR on 2-3 L/min O2 by NC with adequate spontaneous ventilation    Post pain: adequate analgesia    Post assessment: no apparent anesthetic complications and tolerated procedure well    Post vital signs: stable    Level of consciousness: awake, alert and oriented    Nausea/Vomiting: no nausea/vomiting    Complications: none    Transfer of care protocol was followed      Last vitals:   Visit Vitals  BP (!) 108/59 (BP Location: Left arm, Patient Position: Lying)   Pulse 65   Temp 36.8 °C (98.2 °F) (Skin)   Resp 17   Ht 5' 2" (1.575 m)   Wt 81.6 kg (180 lb)   LMP 02/08/2023 (Approximate)   SpO2 96%   Breastfeeding No   BMI 32.92 kg/m²     "

## 2023-02-23 NOTE — BRIEF OP NOTE
[facility] - [facility]  Brief Operative Note    SUMMARY     Surgery Date: 2/23/2023     Surgeon(s) and Role:     * Reinier Riddle MD - Primary    Assisting Surgeon: None    Pre-op Diagnosis:  Ankle dislocation, right, initial encounter [S93.04XA]    Post-op Diagnosis: Post-Op Diagnosis Codes:     * Ankle dislocation, right, initial encounter [S93.04XA]      Procedure(s) (LRB):  CLOSED REDUCTION (Right)    Operative Findings: Displaced comminuted right trimalleolar ankle fracture dislocation.    Estimated Blood Loss: * No values recorded between 2/23/2023  7:22 AM and 2/23/2023  7:45 AM *         Specimens:   Specimen (24h ago, onward)      None

## 2023-02-24 DIAGNOSIS — M25.571 RIGHT ANKLE PAIN, UNSPECIFIED CHRONICITY: Primary | ICD-10-CM

## 2023-02-25 PROBLEM — S93.04XA ANKLE DISLOCATION, RIGHT, INITIAL ENCOUNTER: Status: ACTIVE | Noted: 2023-02-25

## 2023-02-26 DIAGNOSIS — S82.851A CLOSED DISPLACED TRIMALLEOLAR FRACTURE OF RIGHT ANKLE, INITIAL ENCOUNTER: Primary | ICD-10-CM

## 2023-02-26 RX ORDER — MUPIROCIN 20 MG/G
OINTMENT TOPICAL
Status: CANCELLED | OUTPATIENT
Start: 2023-02-26

## 2023-02-27 RX ORDER — ACETAMINOPHEN 325 MG/1
325 TABLET ORAL EVERY 6 HOURS PRN
COMMUNITY

## 2023-02-28 ENCOUNTER — HOSPITAL ENCOUNTER (OUTPATIENT)
Dept: RADIOLOGY | Facility: HOSPITAL | Age: 33
Discharge: HOME | End: 2023-02-28
Attending: ORTHOPAEDIC SURGERY
Payer: MEDICAID

## 2023-02-28 ENCOUNTER — OFFICE VISIT (OUTPATIENT)
Dept: ORTHOPEDICS | Facility: CLINIC | Age: 33
End: 2023-02-28
Payer: MEDICAID

## 2023-02-28 ENCOUNTER — ANESTHESIA EVENT (OUTPATIENT)
Dept: SURGERY | Facility: HOSPITAL | Age: 33
End: 2023-02-28
Payer: MEDICAID

## 2023-02-28 VITALS — HEIGHT: 62 IN | BODY MASS INDEX: 36.8 KG/M2 | WEIGHT: 200 LBS

## 2023-02-28 DIAGNOSIS — S82.851A CLOSED DISPLACED TRIMALLEOLAR FRACTURE OF RIGHT ANKLE, INITIAL ENCOUNTER: Primary | ICD-10-CM

## 2023-02-28 DIAGNOSIS — M25.571 RIGHT ANKLE PAIN, UNSPECIFIED CHRONICITY: ICD-10-CM

## 2023-02-28 PROCEDURE — 73610 XR ANKLE COMPLETE 3 VIEW RIGHT: ICD-10-PCS | Mod: 26,RT,, | Performed by: RADIOLOGY

## 2023-02-28 PROCEDURE — 99999 PR PBB SHADOW E&M-EST. PATIENT-LVL II: ICD-10-PCS | Mod: PBBFAC,,, | Performed by: ORTHOPAEDIC SURGERY

## 2023-02-28 PROCEDURE — 1159F PR MEDICATION LIST DOCUMENTED IN MEDICAL RECORD: ICD-10-PCS | Mod: CPTII,,, | Performed by: ORTHOPAEDIC SURGERY

## 2023-02-28 PROCEDURE — 99999 PR PBB SHADOW E&M-EST. PATIENT-LVL II: CPT | Mod: PBBFAC,,, | Performed by: ORTHOPAEDIC SURGERY

## 2023-02-28 PROCEDURE — 1160F PR REVIEW ALL MEDS BY PRESCRIBER/CLIN PHARMACIST DOCUMENTED: ICD-10-PCS | Mod: CPTII,,, | Performed by: ORTHOPAEDIC SURGERY

## 2023-02-28 PROCEDURE — 99024 PR POST-OP FOLLOW-UP VISIT: ICD-10-PCS | Mod: ,,, | Performed by: ORTHOPAEDIC SURGERY

## 2023-02-28 PROCEDURE — 3008F BODY MASS INDEX DOCD: CPT | Mod: CPTII,,, | Performed by: ORTHOPAEDIC SURGERY

## 2023-02-28 PROCEDURE — 1159F MED LIST DOCD IN RCRD: CPT | Mod: CPTII,,, | Performed by: ORTHOPAEDIC SURGERY

## 2023-02-28 PROCEDURE — 73610 X-RAY EXAM OF ANKLE: CPT | Mod: 26,RT,, | Performed by: RADIOLOGY

## 2023-02-28 PROCEDURE — 3008F PR BODY MASS INDEX (BMI) DOCUMENTED: ICD-10-PCS | Mod: CPTII,,, | Performed by: ORTHOPAEDIC SURGERY

## 2023-02-28 PROCEDURE — 99212 OFFICE O/P EST SF 10 MIN: CPT | Mod: PBBFAC,PN | Performed by: ORTHOPAEDIC SURGERY

## 2023-02-28 PROCEDURE — 1160F RVW MEDS BY RX/DR IN RCRD: CPT | Mod: CPTII,,, | Performed by: ORTHOPAEDIC SURGERY

## 2023-02-28 PROCEDURE — 73610 X-RAY EXAM OF ANKLE: CPT | Mod: TC,PO,RT

## 2023-02-28 PROCEDURE — 99024 POSTOP FOLLOW-UP VISIT: CPT | Mod: ,,, | Performed by: ORTHOPAEDIC SURGERY

## 2023-03-01 ENCOUNTER — HOSPITAL ENCOUNTER (OUTPATIENT)
Facility: HOSPITAL | Age: 33
Discharge: HOME | End: 2023-03-01
Attending: ORTHOPAEDIC SURGERY | Admitting: ORTHOPAEDIC SURGERY
Payer: MEDICAID

## 2023-03-01 ENCOUNTER — HOSPITAL ENCOUNTER (OUTPATIENT)
Dept: RADIOLOGY | Facility: HOSPITAL | Age: 33
Discharge: HOME | End: 2023-03-01
Attending: ORTHOPAEDIC SURGERY | Admitting: ORTHOPAEDIC SURGERY
Payer: MEDICAID

## 2023-03-01 ENCOUNTER — ANESTHESIA (OUTPATIENT)
Dept: SURGERY | Facility: HOSPITAL | Age: 33
End: 2023-03-01
Payer: MEDICAID

## 2023-03-01 DIAGNOSIS — S93.431A ANKLE SYNDESMOSIS DISRUPTION, RIGHT, INITIAL ENCOUNTER: Primary | ICD-10-CM

## 2023-03-01 DIAGNOSIS — M25.571 RIGHT ANKLE PAIN: ICD-10-CM

## 2023-03-01 DIAGNOSIS — S82.851A CLOSED DISPLACED TRIMALLEOLAR FRACTURE OF RIGHT ANKLE, INITIAL ENCOUNTER: ICD-10-CM

## 2023-03-01 LAB
B-HCG UR QL: NEGATIVE
CTP QC/QA: YES

## 2023-03-01 PROCEDURE — 25000003 PHARM REV CODE 250: Mod: PO | Performed by: NURSE ANESTHETIST, CERTIFIED REGISTERED

## 2023-03-01 PROCEDURE — 76000 FLUOROSCOPY <1 HR PHYS/QHP: CPT | Mod: TC,PO

## 2023-03-01 PROCEDURE — 36000709 HC OR TIME LEV III EA ADD 15 MIN: Mod: PO | Performed by: ORTHOPAEDIC SURGERY

## 2023-03-01 PROCEDURE — C1769 GUIDE WIRE: HCPCS | Mod: PO | Performed by: ORTHOPAEDIC SURGERY

## 2023-03-01 PROCEDURE — 01480 ANES OPEN PX LOWER L/A/F NOS: CPT | Mod: PO | Performed by: ORTHOPAEDIC SURGERY

## 2023-03-01 PROCEDURE — 27201423 OPTIME MED/SURG SUP & DEVICES STERILE SUPPLY: Mod: PO | Performed by: ORTHOPAEDIC SURGERY

## 2023-03-01 PROCEDURE — 36000708 HC OR TIME LEV III 1ST 15 MIN: Mod: PO | Performed by: ORTHOPAEDIC SURGERY

## 2023-03-01 PROCEDURE — 64445 NJX AA&/STRD SCIATIC NRV IMG: CPT | Mod: PO | Performed by: ANESTHESIOLOGY

## 2023-03-01 PROCEDURE — D9220A PRA ANESTHESIA: ICD-10-PCS | Mod: CRNA,,, | Performed by: NURSE ANESTHETIST, CERTIFIED REGISTERED

## 2023-03-01 PROCEDURE — 25000003 PHARM REV CODE 250: Mod: PO | Performed by: ANESTHESIOLOGY

## 2023-03-01 PROCEDURE — D9220A PRA ANESTHESIA: Mod: ANES,,, | Performed by: ANESTHESIOLOGY

## 2023-03-01 PROCEDURE — 63600175 PHARM REV CODE 636 W HCPCS: Mod: PO | Performed by: NURSE ANESTHETIST, CERTIFIED REGISTERED

## 2023-03-01 PROCEDURE — 27822 PR OPEN TX TRIMALLEOLAR ANKLE FX W/O FIX PST LIP: ICD-10-PCS | Mod: 58,RT,, | Performed by: ORTHOPAEDIC SURGERY

## 2023-03-01 PROCEDURE — 63600175 PHARM REV CODE 636 W HCPCS: Mod: PO | Performed by: ANESTHESIOLOGY

## 2023-03-01 PROCEDURE — 27829 PR OPEN TX DISTAL TIBIOFIBULAR JOINT DISRUPTION: ICD-10-PCS | Mod: 58,51,RT, | Performed by: ORTHOPAEDIC SURGERY

## 2023-03-01 PROCEDURE — 71000015 HC POSTOP RECOV 1ST HR: Mod: PO | Performed by: ORTHOPAEDIC SURGERY

## 2023-03-01 PROCEDURE — 64450 POPLITEAL: ICD-10-PCS | Mod: 59,RT,, | Performed by: ANESTHESIOLOGY

## 2023-03-01 PROCEDURE — 64447 NJX AA&/STRD FEMORAL NRV IMG: CPT | Mod: 59,PO,RT | Performed by: ANESTHESIOLOGY

## 2023-03-01 PROCEDURE — 64447 ADDUCTOR CANAL: ICD-10-PCS | Mod: 59,RT,, | Performed by: ANESTHESIOLOGY

## 2023-03-01 PROCEDURE — C9290 INJ, BUPIVACAINE LIPOSOME: HCPCS | Mod: PO | Performed by: ANESTHESIOLOGY

## 2023-03-01 PROCEDURE — 37000008 HC ANESTHESIA 1ST 15 MINUTES: Mod: PO | Performed by: ORTHOPAEDIC SURGERY

## 2023-03-01 PROCEDURE — 63600175 PHARM REV CODE 636 W HCPCS: Mod: PO | Performed by: ORTHOPAEDIC SURGERY

## 2023-03-01 PROCEDURE — 27822 TREATMENT OF ANKLE FRACTURE: CPT | Mod: 58,RT,, | Performed by: ORTHOPAEDIC SURGERY

## 2023-03-01 PROCEDURE — 71000033 HC RECOVERY, INTIAL HOUR: Mod: PO | Performed by: ORTHOPAEDIC SURGERY

## 2023-03-01 PROCEDURE — D9220A PRA ANESTHESIA: Mod: CRNA,,, | Performed by: NURSE ANESTHETIST, CERTIFIED REGISTERED

## 2023-03-01 PROCEDURE — 81025 URINE PREGNANCY TEST: CPT | Mod: PO | Performed by: ORTHOPAEDIC SURGERY

## 2023-03-01 PROCEDURE — 27829 TREAT LOWER LEG JOINT: CPT | Mod: 58,51,RT, | Performed by: ORTHOPAEDIC SURGERY

## 2023-03-01 PROCEDURE — 64450 NJX AA&/STRD OTHER PN/BRANCH: CPT | Mod: 59,RT,, | Performed by: ANESTHESIOLOGY

## 2023-03-01 PROCEDURE — C1713 ANCHOR/SCREW BN/BN,TIS/BN: HCPCS | Mod: PO | Performed by: ORTHOPAEDIC SURGERY

## 2023-03-01 PROCEDURE — D9220A PRA ANESTHESIA: ICD-10-PCS | Mod: ANES,,, | Performed by: ANESTHESIOLOGY

## 2023-03-01 PROCEDURE — 25000003 PHARM REV CODE 250: Mod: PO | Performed by: ORTHOPAEDIC SURGERY

## 2023-03-01 PROCEDURE — 37000009 HC ANESTHESIA EA ADD 15 MINS: Mod: PO | Performed by: ORTHOPAEDIC SURGERY

## 2023-03-01 DEVICE — SCREW R3CON NLOK PLT 3.5X12MM: Type: IMPLANTABLE DEVICE | Site: ANKLE | Status: FUNCTIONAL

## 2023-03-01 DEVICE — SCREW R3CON LOK PLATE 2.7X14MM: Type: IMPLANTABLE DEVICE | Site: ANKLE | Status: FUNCTIONAL

## 2023-03-01 DEVICE — SCREW R3CON LOK PLATE 2.7X12MM: Type: IMPLANTABLE DEVICE | Site: ANKLE | Status: FUNCTIONAL

## 2023-03-01 DEVICE — IMPLANTABLE DEVICE: Type: IMPLANTABLE DEVICE | Site: ANKLE | Status: FUNCTIONAL

## 2023-03-01 DEVICE — SCREW R3CON NLOK PLT 3.5X14MM: Type: IMPLANTABLE DEVICE | Site: ANKLE | Status: FUNCTIONAL

## 2023-03-01 RX ORDER — ASPIRIN 81 MG/1
81 TABLET ORAL DAILY
Qty: 90 TABLET | Refills: 1 | Status: SHIPPED | OUTPATIENT
Start: 2023-03-01 | End: 2023-05-30

## 2023-03-01 RX ORDER — SODIUM CHLORIDE, SODIUM LACTATE, POTASSIUM CHLORIDE, CALCIUM CHLORIDE 600; 310; 30; 20 MG/100ML; MG/100ML; MG/100ML; MG/100ML
INJECTION, SOLUTION INTRAVENOUS CONTINUOUS
Status: DISCONTINUED | OUTPATIENT
Start: 2023-03-01 | End: 2023-03-01 | Stop reason: HOSPADM

## 2023-03-01 RX ORDER — MIDAZOLAM HYDROCHLORIDE 1 MG/ML
0.5 INJECTION INTRAMUSCULAR; INTRAVENOUS
Status: DISCONTINUED | OUTPATIENT
Start: 2023-03-02 | End: 2023-03-01 | Stop reason: HOSPADM

## 2023-03-01 RX ORDER — FENTANYL CITRATE 50 UG/ML
25 INJECTION, SOLUTION INTRAMUSCULAR; INTRAVENOUS EVERY 5 MIN PRN
Status: DISCONTINUED | OUTPATIENT
Start: 2023-03-01 | End: 2023-03-01 | Stop reason: HOSPADM

## 2023-03-01 RX ORDER — PHENYLEPHRINE HYDROCHLORIDE 10 MG/ML
INJECTION INTRAVENOUS
Status: DISCONTINUED | OUTPATIENT
Start: 2023-03-01 | End: 2023-03-01

## 2023-03-01 RX ORDER — DEXMEDETOMIDINE HYDROCHLORIDE 100 UG/ML
INJECTION, SOLUTION INTRAVENOUS CONTINUOUS PRN
Status: DISCONTINUED | OUTPATIENT
Start: 2023-03-01 | End: 2023-03-01

## 2023-03-01 RX ORDER — FENTANYL CITRATE 50 UG/ML
25 INJECTION, SOLUTION INTRAMUSCULAR; INTRAVENOUS EVERY 5 MIN PRN
Status: DISCONTINUED | OUTPATIENT
Start: 2023-03-02 | End: 2023-03-01 | Stop reason: HOSPADM

## 2023-03-01 RX ORDER — KETAMINE HCL IN 0.9 % NACL 50 MG/5 ML
SYRINGE (ML) INTRAVENOUS
Status: DISCONTINUED | OUTPATIENT
Start: 2023-03-01 | End: 2023-03-01

## 2023-03-01 RX ORDER — HYDROMORPHONE HYDROCHLORIDE 2 MG/ML
0.2 INJECTION, SOLUTION INTRAMUSCULAR; INTRAVENOUS; SUBCUTANEOUS EVERY 5 MIN PRN
Status: DISCONTINUED | OUTPATIENT
Start: 2023-03-01 | End: 2023-03-01 | Stop reason: HOSPADM

## 2023-03-01 RX ORDER — OXYCODONE HYDROCHLORIDE 5 MG/1
5 TABLET ORAL
Status: DISCONTINUED | OUTPATIENT
Start: 2023-03-01 | End: 2023-03-01 | Stop reason: HOSPADM

## 2023-03-01 RX ORDER — MIDAZOLAM HYDROCHLORIDE 1 MG/ML
INJECTION INTRAMUSCULAR; INTRAVENOUS
Status: DISCONTINUED | OUTPATIENT
Start: 2023-03-01 | End: 2023-03-01

## 2023-03-01 RX ORDER — LIDOCAINE HCL/PF 100 MG/5ML
SYRINGE (ML) INTRAVENOUS
Status: DISCONTINUED | OUTPATIENT
Start: 2023-03-01 | End: 2023-03-01

## 2023-03-01 RX ORDER — CEFAZOLIN SODIUM 2 G/50ML
2 SOLUTION INTRAVENOUS
Status: COMPLETED | OUTPATIENT
Start: 2023-03-01 | End: 2023-03-01

## 2023-03-01 RX ORDER — FENTANYL CITRATE 50 UG/ML
INJECTION, SOLUTION INTRAMUSCULAR; INTRAVENOUS
Status: DISCONTINUED | OUTPATIENT
Start: 2023-03-01 | End: 2023-03-01

## 2023-03-01 RX ORDER — OXYCODONE AND ACETAMINOPHEN 5; 325 MG/1; MG/1
1 TABLET ORAL
Qty: 42 TABLET | Refills: 0 | Status: SHIPPED | OUTPATIENT
Start: 2023-03-01 | End: 2023-03-08

## 2023-03-01 RX ORDER — ACETAMINOPHEN 10 MG/ML
INJECTION, SOLUTION INTRAVENOUS
Status: DISCONTINUED | OUTPATIENT
Start: 2023-03-01 | End: 2023-03-01

## 2023-03-01 RX ORDER — PROPOFOL 10 MG/ML
VIAL (ML) INTRAVENOUS
Status: DISCONTINUED | OUTPATIENT
Start: 2023-03-01 | End: 2023-03-01

## 2023-03-01 RX ORDER — LIDOCAINE HYDROCHLORIDE 10 MG/ML
1 INJECTION, SOLUTION EPIDURAL; INFILTRATION; INTRACAUDAL; PERINEURAL ONCE
Status: DISCONTINUED | OUTPATIENT
Start: 2023-03-01 | End: 2023-03-01 | Stop reason: HOSPADM

## 2023-03-01 RX ORDER — BUPIVACAINE HYDROCHLORIDE 5 MG/ML
INJECTION, SOLUTION EPIDURAL; INTRACAUDAL
Status: COMPLETED | OUTPATIENT
Start: 2023-03-01 | End: 2023-03-01

## 2023-03-01 RX ORDER — MUPIROCIN 20 MG/G
OINTMENT TOPICAL
Status: DISCONTINUED | OUTPATIENT
Start: 2023-03-01 | End: 2023-03-01 | Stop reason: HOSPADM

## 2023-03-01 RX ORDER — ONDANSETRON 2 MG/ML
INJECTION INTRAMUSCULAR; INTRAVENOUS
Status: DISCONTINUED | OUTPATIENT
Start: 2023-03-01 | End: 2023-03-01

## 2023-03-01 RX ORDER — METOCLOPRAMIDE HYDROCHLORIDE 5 MG/ML
10 INJECTION INTRAMUSCULAR; INTRAVENOUS EVERY 10 MIN PRN
Status: DISCONTINUED | OUTPATIENT
Start: 2023-03-01 | End: 2023-03-01 | Stop reason: HOSPADM

## 2023-03-01 RX ADMIN — FENTANYL CITRATE 50 MCG: 50 INJECTION, SOLUTION INTRAMUSCULAR; INTRAVENOUS at 09:03

## 2023-03-01 RX ADMIN — BUPIVACAINE HYDROCHLORIDE 15 ML: 5 INJECTION, SOLUTION EPIDURAL; INTRACAUDAL; PERINEURAL at 08:03

## 2023-03-01 RX ADMIN — PHENYLEPHRINE HYDROCHLORIDE 100 MCG: 10 INJECTION INTRAVENOUS at 12:03

## 2023-03-01 RX ADMIN — BUPIVACAINE 20 ML: 13.3 INJECTION, SUSPENSION, LIPOSOMAL INFILTRATION at 09:03

## 2023-03-01 RX ADMIN — SODIUM CHLORIDE, SODIUM LACTATE, POTASSIUM CHLORIDE, AND CALCIUM CHLORIDE: .6; .31; .03; .02 INJECTION, SOLUTION INTRAVENOUS at 11:03

## 2023-03-01 RX ADMIN — ACETAMINOPHEN 1000 MG: 10 INJECTION, SOLUTION INTRAVENOUS at 11:03

## 2023-03-01 RX ADMIN — FENTANYL CITRATE 50 MCG: 50 INJECTION, SOLUTION INTRAMUSCULAR; INTRAVENOUS at 11:03

## 2023-03-01 RX ADMIN — MUPIROCIN: 20 OINTMENT TOPICAL at 09:03

## 2023-03-01 RX ADMIN — BUPIVACAINE HYDROCHLORIDE 10 ML: 5 INJECTION, SOLUTION EPIDURAL; INTRACAUDAL at 09:03

## 2023-03-01 RX ADMIN — MIDAZOLAM HYDROCHLORIDE 2 MG: 1 INJECTION, SOLUTION INTRAMUSCULAR; INTRAVENOUS at 09:03

## 2023-03-01 RX ADMIN — FENTANYL CITRATE 50 MCG: 50 INJECTION, SOLUTION INTRAMUSCULAR; INTRAVENOUS at 10:03

## 2023-03-01 RX ADMIN — LIDOCAINE HYDROCHLORIDE 100 MG: 20 INJECTION, SOLUTION INTRAVENOUS at 10:03

## 2023-03-01 RX ADMIN — FENTANYL CITRATE 100 MCG: 50 INJECTION, SOLUTION INTRAMUSCULAR; INTRAVENOUS at 08:03

## 2023-03-01 RX ADMIN — SODIUM CHLORIDE, SODIUM LACTATE, POTASSIUM CHLORIDE, AND CALCIUM CHLORIDE: .6; .31; .03; .02 INJECTION, SOLUTION INTRAVENOUS at 08:03

## 2023-03-01 RX ADMIN — PROPOFOL 150 MG: 10 INJECTION, EMULSION INTRAVENOUS at 10:03

## 2023-03-01 RX ADMIN — Medication 20 MG: at 11:03

## 2023-03-01 RX ADMIN — GLYCOPYRROLATE 0.2 MG: 0.2 INJECTION, SOLUTION INTRAMUSCULAR; INTRAVENOUS at 12:03

## 2023-03-01 RX ADMIN — FENTANYL CITRATE 50 MCG: 50 INJECTION, SOLUTION INTRAMUSCULAR; INTRAVENOUS at 12:03

## 2023-03-01 RX ADMIN — Medication 30 MG: at 10:03

## 2023-03-01 RX ADMIN — ONDANSETRON 4 MG: 2 INJECTION, SOLUTION INTRAMUSCULAR; INTRAVENOUS at 10:03

## 2023-03-01 RX ADMIN — CEFAZOLIN SODIUM 2 G: 2 SOLUTION INTRAVENOUS at 10:03

## 2023-03-01 RX ADMIN — PROPOFOL 50 MG: 10 INJECTION, EMULSION INTRAVENOUS at 11:03

## 2023-03-01 RX ADMIN — DEXMEDETOMIDINE HYDROCHLORIDE 0.2 MCG/KG/HR: 100 INJECTION, SOLUTION, CONCENTRATE INTRAVENOUS at 10:03

## 2023-03-01 RX ADMIN — MIDAZOLAM 2 MG: 1 INJECTION INTRAMUSCULAR; INTRAVENOUS at 08:03

## 2023-03-01 NOTE — ANESTHESIA PROCEDURE NOTES
POPLITEAL    Patient location during procedure: pre-op   Block not for primary anesthetic.  Reason for block: at surgeon's request and post-op pain management   Post-op Pain Location: RIGHT ANKLE FRACTURE-ORIF RIGHT ANKLE FRACTURE   Start time: 3/1/2023 8:56 AM  Timeout: 3/1/2023 8:56 AM   End time: 3/1/2023 9:06 AM    Staffing  Authorizing Provider: Elton Benavidez MD  Performing Provider: Elton Benavidez MD    Preanesthetic Checklist  Completed: patient identified, IV checked, site marked, risks and benefits discussed, surgical consent, monitors and equipment checked, pre-op evaluation and timeout performed  Peripheral Block  Patient position: left lateral decubitus  Prep: ChloraPrep and DuraPrep  Patient monitoring: heart rate, cardiac monitor, continuous capnometry and frequent blood pressure checks  Block type: popliteal  Laterality: right  Injection technique: single shot  Needle  Needle type: Stimuplex   Needle gauge: 21 G  Needle localization: ultrasound guidance   -ultrasound image captured on disc.  Assessment  Injection assessment: negative aspiration, negative parasthesia and local visualized surrounding nerve  Paresthesia pain: none  Pain Tolerance: comfortable throughout block and no complaints  Medications:    Medications: bupivacaine (pf) (MARCAINE) injection 0.5% - Perineural   10 mL - 3/1/2023 9:04:00 AM

## 2023-03-01 NOTE — DISCHARGE SUMMARY
[facility] - [facility]  Discharge Note  Short Stay    Procedure(s) (LRB):  ORIF, ANKLE (Right)  FIXATION, SYNDESMOSIS, ANKLE (Right)      OUTCOME: Patient tolerated treatment/procedure well without complication and is now ready for discharge.    DISPOSITION: Home or Self Care    FINAL DIAGNOSIS:  <principal problem not specified>    FOLLOWUP: In clinic    DISCHARGE INSTRUCTIONS:  No discharge procedures on file.     TIME SPENT ON DISCHARGE: 15 minutes

## 2023-03-01 NOTE — ANESTHESIA PROCEDURE NOTES
ADDUCTOR CANAL    Patient location during procedure: pre-op   Block not for primary anesthetic.  Reason for block: at surgeon's request and post-op pain management   Post-op Pain Location: RIGHT ANKLE FRACTURE-ORIF OF RIGHT ANKLE FRACTURE   Start time: 3/1/2023 8:40 AM  Timeout: 3/1/2023 8:40 AM   End time: 3/1/2023 8:55 AM    Staffing  Authorizing Provider: Elton Benavidez MD  Performing Provider: Elton Benavidez MD    Preanesthetic Checklist  Completed: patient identified, IV checked, site marked, risks and benefits discussed, surgical consent, monitors and equipment checked, pre-op evaluation and timeout performed  Peripheral Block  Patient position: supine  Prep: ChloraPrep  Patient monitoring: cardiac monitor, heart rate, continuous pulse ox, continuous capnometry and frequent blood pressure checks  Block type: adductor canal  Laterality: right  Injection technique: single shot  Needle  Needle type: Stimuplex   Needle gauge: 21 G  Needle localization: ultrasound guidance   -ultrasound image captured on disc.  Assessment  Injection assessment: negative parasthesia, local visualized surrounding nerve and negative aspiration  Paresthesia pain: none  Heart rate change: no  Slow fractionated injection: yes  Pain Tolerance: comfortable throughout block and no complaints  Medications:    Medications: bupivacaine (pf) (MARCAINE) injection 0.5% - Perineural   15 mL - 3/1/2023 8:50:00 AM    Additional Notes  VSS DURING PROCEDURE         no

## 2023-03-01 NOTE — OR NURSING
Right saphenous and popliteal single shot block complete per Dr. Benavidez with Anesthesia. Pt tolerated well. Exparel band applied to pt. See Anesthesia record for procedural notes. See flowsheet and MAR for vitals and medications. Monitors in place, alarms audible. VSS. etCO2 monitoring in place. Resp even, unlabored. Skin warm, dry. Pt in NAD. Pt awaiting transport to OR. Family at bedside. Bed in lowest, locked position. Side rails up x2. Call light within reach.

## 2023-03-01 NOTE — PROGRESS NOTES
Status/Diagnosis: Right trimalleolar ankle fracture dislocation  Date of Surgery: none  Date of Injury: 02/19/2023  Return visit: 2 weeks postop  X-rays on Return: NWB 3-views Right ankle XOP    Chief Complaint:   Chief Complaint   Patient presents with    Right Ankle - Pain     Present History:  Estrellita Garcia is a 32 y.o. female who returns today for repeat evaluation and skin check.  She is status post closed reduction and short-leg plaster splint placement last week.  Still with moderate persistent pain.  Reports compliance with nonweightbearing status.  Has been elevating the extremity as much as possible.   Vague history of heart arrhythmia, on atenolol.  Managed by Dr. Holcomb.  Vapes regularly with nicotine.  Works from home.      Past Medical History:   Diagnosis Date    Hypertension     Palpitations        Past Surgical History:   Procedure Laterality Date    APPLICATION OF SPLINT Right 2/23/2023    Procedure: APPLICATION, SPLINT;  Surgeon: Reinier Riddle MD;  Location: [facility] OR;  Service: Orthopedics;  Laterality: Right;    CLOSED REDUCTION Right 2/23/2023    Procedure: CLOSED REDUCTION;  Surgeon: Reinier Riddle MD;  Location: [facility] OR;  Service: Orthopedics;  Laterality: Right;  placement of short leg splint     uterine polyps  2014       Current Outpatient Medications   Medication Sig    acetaminophen (TYLENOL) 325 MG tablet Take 325 mg by mouth every 6 (six) hours as needed for Pain.    ALPRAZolam (XANAX) 0.25 MG tablet Take 0.25 mg by mouth daily as needed.    atenoloL (TENORMIN) 25 MG tablet Take 1 tablet (25 mg total) by mouth once daily. (Patient taking differently: Take 25 mg by mouth every evening.)    dextroamphetamine-amphetamine (ADDERALL) 15 mg tablet Take 15 mg by mouth 2 (two) times daily.    EScitalopram oxalate (LEXAPRO) 20 MG tablet Take 20 mg by mouth every evening. Takes 30mg    ondansetron (ZOFRAN) 8 MG tablet Take 1 tablet (8 mg total) by mouth every 12 (twelve) hours as needed  for Nausea.    oxyCODONE-acetaminophen (PERCOCET) 5-325 mg per tablet Take 1 tablet by mouth every 6 (six) hours as needed for Pain.     No current facility-administered medications for this visit.       Review of patient's allergies indicates:  No Known Allergies    Family History   Problem Relation Age of Onset    No Known Problems Mother     No Known Problems Father        Social History     Socioeconomic History    Marital status: Single   Tobacco Use    Smoking status: Some Days     Types: Vaping with nicotine    Smokeless tobacco: Never   Substance and Sexual Activity    Alcohol use: Yes     Comment: socially    Drug use: Never    Sexual activity: Yes       Physical exam:  There were no vitals filed for this visit.  Body mass index is 36.58 kg/m².  General: In no apparent distress; well developed and well nourished.  HEENT: normocephalic; atraumatic.  Cardiovascular: regular rate.  Respiratory: no increased work of breathing.  Musculoskeletal:   Gait: unable to assess  Inspection:  Short-leg splint in place clean, dry, intact.  Did not fully take down due to history of closed reduction however after removing the anterior cast padding, the previously noted anteromedial superficial abrasion appears moderately improved.  Eschar present.  No deep extension.  No drainage.  No signs or symptoms of infection.  Early skin wrinkling present. Gross motor and sensory function intact distally.  Brisk cap refill all 5 digits.                   Imaging Studies/Outside documentation:  I have ordered/reviewed/interpreted the following images/outside documentation:  1. NWB 3-views Right ankle:  Persistent trimalleolar ankle fracture however improved alignment versus pre reduction x-ray.        Assessment:  Estrellita Garcia is a 32 y.o. female with Right trimalleolar ankle fracture dislocation.     Plan:   Clinical and radiographic findings were discussed. Operative vs nonoperative treatment options were described. These include  but are not limited to bleeding; infection; damage to surrounding nerves or vessels; persistent pain, stiffness; nonunion; malunion; recurrent deformity; need for additional procedures; amputation; blood clots; pulmonary embolus; cardiac events; stroke; and the general risks of anesthesia including anesthetic death.   We also reviewed postop protocol as well as limitations and expectations.   Specifically discussed this would be for Stage 2 of 2 with definitive ORIF  Patient understands and desires to proceed with surgical intervention.   Explained risks, benefits, and alternative to the patient. Asked if any questions--none.  Surgery to include but not limited to:   Right ankle fracture open reduction internal fixation; possible ligament repair; surgery as indicated.        This note was created using voice recognition software and may contain grammatical errors.

## 2023-03-01 NOTE — BRIEF OP NOTE
[facility] - Surgery  Brief Operative Note    SUMMARY     Surgery Date: 3/1/2023     Surgeon(s) and Role:     * Reinier Riddle MD - Primary    Assisting Surgeon: None    Pre-op Diagnosis:  Closed displaced trimalleolar fracture of right ankle, initial encounter [S82.851A]    Post-op Diagnosis: Post-Op Diagnosis Codes:     * Closed displaced trimalleolar fracture of right ankle, initial encounter [S82.851A]      Procedure(s) (LRB):  ORIF, ANKLE (Right)  FIXATION, SYNDESMOSIS, ANKLE (Right)    Implants: Grady 9-hole plate. 4.0mm jim screws. Tightrope    Operative Findings: Comminuted medial malleolus fracture. Persistent clear space widening after medial and lateral malleolar fracture ORIF.    Estimated Blood Loss: * No values recorded between 3/1/2023 10:28 AM and 3/1/2023 12:44 PM *         Specimens:   Specimen (24h ago, onward)      None

## 2023-03-01 NOTE — PLAN OF CARE
Patient is being discharged home with family. Patient remained free from falls, injuries, or accidents during stay. Patient education provided by this nurse and has been given discharge paperwork containing follow up orders and education.

## 2023-03-01 NOTE — TRANSFER OF CARE
"Anesthesia Transfer of Care Note    Patient: Estrellita Garcia    Procedure(s) Performed: Procedure(s) (LRB):  ORIF, ANKLE (Right)  FIXATION, SYNDESMOSIS, ANKLE (Right)    Patient location: PACU    Anesthesia Type: general    Transport from OR: Transported from OR on room air with adequate spontaneous ventilation    Post pain: adequate analgesia    Post assessment: no apparent anesthetic complications and tolerated procedure well    Post vital signs: stable    Level of consciousness: awake and sedated    Nausea/Vomiting: no nausea/vomiting    Complications: none    Transfer of care protocol was followed      Last vitals:   Visit Vitals  /65 (BP Location: Right arm, Patient Position: Sitting)   Pulse 80   Temp 36.8 °C (98.2 °F) (Skin)   Resp 16   Ht 5' 2" (1.575 m)   Wt 90.7 kg (200 lb)   LMP 02/08/2023 (Approximate)   SpO2 100%   Breastfeeding No   BMI 36.58 kg/m²     "

## 2023-03-01 NOTE — ANESTHESIA PREPROCEDURE EVALUATION
03/01/2023  Estrellita Garcia is a 32 y.o., female.      Pre-op Assessment    I have reviewed the Patient Summary Reports.     I have reviewed the Nursing Notes. I have reviewed the NPO Status.   I have reviewed the Medications.     Review of Systems  Anesthesia Hx:  No problems with previous Anesthesia    Social:  Non-Smoker    Cardiovascular:   Hypertension Denies MI.  Denies CAD.    Denies CABG/stent.   Denies Angina. ? The left ventricle is normal in size with normal systolic function.  ? The estimated ejection fraction is 55-60%.  ? Normal left ventricular diastolic function.  ? Normal right ventricular systolic function.  ? Normal central venous pressure (3 mmHg).      Pulmonary:   Denies COPD.  Denies Asthma.  Denies Recent URI.    Renal/:   Denies Chronic Renal Disease.     Hepatic/GI:   Denies GERD. Denies Liver Disease.    Neurological:   Denies TIA. Denies CVA. Denies Seizures.    Endocrine:   Denies Diabetes. Denies Hypothyroidism.    Psych:   Denies Psychiatric History.          Physical Exam  General: Well nourished, Cooperative, Alert and Oriented    Airway:  Mallampati: II / II  Mouth Opening: Normal  TM Distance: 4 - 6 cm  Tongue: Normal    Dental:  Intact    Chest/Lungs:  Clear to auscultation, Normal Respiratory Rate    Heart:  Rate: Normal  Rhythm: Regular Rhythm  Sounds: Normal        Anesthesia Plan  Type of Anesthesia, risks & benefits discussed:    Anesthesia Type: Gen Natural Airway  Intra-op Monitoring Plan: Standard ASA Monitors  Induction:  IV  Informed Consent: Informed consent signed with the Patient and all parties understand the risks and agree with anesthesia plan.  All questions answered.   ASA Score: 1 Emergent    Ready For Surgery From Anesthesia Perspective.     .

## 2023-03-01 NOTE — H&P (VIEW-ONLY)
Status/Diagnosis: Right trimalleolar ankle fracture dislocation  Date of Surgery: none  Date of Injury: 02/19/2023  Return visit: 2 weeks postop  X-rays on Return: NWB 3-views Right ankle XOP    Chief Complaint:   Chief Complaint   Patient presents with    Right Ankle - Pain     Present History:  Estrellita Garcia is a 32 y.o. female who returns today for repeat evaluation and skin check.  She is status post closed reduction and short-leg plaster splint placement last week.  Still with moderate persistent pain.  Reports compliance with nonweightbearing status.  Has been elevating the extremity as much as possible.   Vague history of heart arrhythmia, on atenolol.  Managed by Dr. Holcomb.  Vapes regularly with nicotine.  Works from home.      Past Medical History:   Diagnosis Date    Hypertension     Palpitations        Past Surgical History:   Procedure Laterality Date    APPLICATION OF SPLINT Right 2/23/2023    Procedure: APPLICATION, SPLINT;  Surgeon: Reinier Riddle MD;  Location: [facility] OR;  Service: Orthopedics;  Laterality: Right;    CLOSED REDUCTION Right 2/23/2023    Procedure: CLOSED REDUCTION;  Surgeon: Reinier Riddle MD;  Location: [facility] OR;  Service: Orthopedics;  Laterality: Right;  placement of short leg splint     uterine polyps  2014       Current Outpatient Medications   Medication Sig    acetaminophen (TYLENOL) 325 MG tablet Take 325 mg by mouth every 6 (six) hours as needed for Pain.    ALPRAZolam (XANAX) 0.25 MG tablet Take 0.25 mg by mouth daily as needed.    atenoloL (TENORMIN) 25 MG tablet Take 1 tablet (25 mg total) by mouth once daily. (Patient taking differently: Take 25 mg by mouth every evening.)    dextroamphetamine-amphetamine (ADDERALL) 15 mg tablet Take 15 mg by mouth 2 (two) times daily.    EScitalopram oxalate (LEXAPRO) 20 MG tablet Take 20 mg by mouth every evening. Takes 30mg    ondansetron (ZOFRAN) 8 MG tablet Take 1 tablet (8 mg total) by mouth every 12 (twelve) hours as needed  for Nausea.    oxyCODONE-acetaminophen (PERCOCET) 5-325 mg per tablet Take 1 tablet by mouth every 6 (six) hours as needed for Pain.     No current facility-administered medications for this visit.       Review of patient's allergies indicates:  No Known Allergies    Family History   Problem Relation Age of Onset    No Known Problems Mother     No Known Problems Father        Social History     Socioeconomic History    Marital status: Single   Tobacco Use    Smoking status: Some Days     Types: Vaping with nicotine    Smokeless tobacco: Never   Substance and Sexual Activity    Alcohol use: Yes     Comment: socially    Drug use: Never    Sexual activity: Yes       Physical exam:  There were no vitals filed for this visit.  Body mass index is 36.58 kg/m².  General: In no apparent distress; well developed and well nourished.  HEENT: normocephalic; atraumatic.  Cardiovascular: regular rate.  Respiratory: no increased work of breathing.  Musculoskeletal:   Gait: unable to assess  Inspection:  Short-leg splint in place clean, dry, intact.  Did not fully take down due to history of closed reduction however after removing the anterior cast padding, the previously noted anteromedial superficial abrasion appears moderately improved.  Eschar present.  No deep extension.  No drainage.  No signs or symptoms of infection.  Early skin wrinkling present. Gross motor and sensory function intact distally.  Brisk cap refill all 5 digits.                   Imaging Studies/Outside documentation:  I have ordered/reviewed/interpreted the following images/outside documentation:  1. NWB 3-views Right ankle:  Persistent trimalleolar ankle fracture however improved alignment versus pre reduction x-ray.        Assessment:  Estrellita Garcia is a 32 y.o. female with Right trimalleolar ankle fracture dislocation.     Plan:   Clinical and radiographic findings were discussed. Operative vs nonoperative treatment options were described. These include  but are not limited to bleeding; infection; damage to surrounding nerves or vessels; persistent pain, stiffness; nonunion; malunion; recurrent deformity; need for additional procedures; amputation; blood clots; pulmonary embolus; cardiac events; stroke; and the general risks of anesthesia including anesthetic death.   We also reviewed postop protocol as well as limitations and expectations.   Specifically discussed this would be for Stage 2 of 2 with definitive ORIF  Patient understands and desires to proceed with surgical intervention.   Explained risks, benefits, and alternative to the patient. Asked if any questions--none.  Surgery to include but not limited to:   Right ankle fracture open reduction internal fixation; possible ligament repair; surgery as indicated.        This note was created using voice recognition software and may contain grammatical errors.

## 2023-03-01 NOTE — ANESTHESIA POSTPROCEDURE EVALUATION
Anesthesia Post Evaluation    Patient: Estrellita Garcia    Procedure(s) Performed: Procedure(s) (LRB):  ORIF, ANKLE (Right)  FIXATION, SYNDESMOSIS, ANKLE (Right)    Final Anesthesia Type: general      Patient location during evaluation: PACU  Patient participation: Yes- Able to Participate  Level of consciousness: sedated and awake  Post-procedure vital signs: reviewed and stable  Pain management: adequate  Airway patency: patent    PONV status at discharge: No PONV  Anesthetic complications: no      Cardiovascular status: blood pressure returned to baseline  Respiratory status: spontaneous ventilation  Hydration status: euvolemic  Follow-up not needed.          Vitals Value Taken Time   /57 03/01/23 1313   Temp 36.5 °C (97.7 °F) 03/01/23 1245   Pulse 85 03/01/23 1313   Resp 16 03/01/23 1313   SpO2 98 % 03/01/23 1313         Event Time   Out of Recovery 12:55:00         Pain/Angus Score: Pain Rating Prior to Med Admin: 8 (3/1/2023  8:35 AM)  Pain Rating Post Med Admin: 2 (3/1/2023  9:30 AM)  Angus Score: 10 (3/1/2023  1:09 PM)

## 2023-03-01 NOTE — ANESTHESIA PROCEDURE NOTES
Intubation    Date/Time: 3/1/2023 10:09 AM  Performed by: Pamela Meeks CRNA  Authorized by: Elton Benavidez MD     Intubation:     Induction:  Intravenous    Mask Ventilation:  Easy mask    Attempts:  1    Attempted By:  CRNA    Method of Intubation:  Other (see comments)    Difficult Airway Encountered?: No      Complications:  None    Airway Device:  Supraglottic airway/LMA    Airway Device Size:  4.0    Style/Cuff Inflation:  Cuffed    Inflation Amount (mL):  10    Placement Verified By:  Capnometry    Complicating Factors:  None    Findings Post-Intubation:  BS equal bilateral

## 2023-03-01 NOTE — OP NOTE
Date: 03/01/2023    Pre-Operative Diagnosis:  Displaced Right trimalleolar ankle fracture.  Disruption of Right ankle syndesmotic complex.     Post-Operative Diagnosis:  Displaced Right trimalleolar ankle fracture.  Disruption of Right ankle syndesmotic complex.     Procedures:  Right trimalleolar ankle fracture open reduction internal fixation without posterior lip fixation. 11509.  Open reduction internal fixation of syndesmotic disruption. 60762.     Surgeon: Reinier Riddle MD    Assistant: CRISTIAN Vasquez     Anesthesia: General with regional block.     Estimated Blood Loss: <5mL.     Drains: None.     Findings: Comminuted medial malleolus fracture. Persistent clear space widening after medial and lateral malleolar fracture ORIF.      Implants: Leola 9-hole distal fibula plate. Locking screws x 4 distal. Cortical screws x 2 proximal. Tightope x 1. 4.0mm partially thread jim screw x 2.      Operative Indication:  Estrellita Garcia is a 32 y.o. female who presented to my clinic after a ground level fall on 2/19/2023. Patient endorses immediate ankle pain, swelling, and inability to bear weight.  Noted to have right ankle fracture dislocation requiring closed reduction under anesthesia on 2/23/23 as Stage 1 of a 2-stage process.   Operative versus non operative treatment options were discussed.  Risks and benefits were described.  These include but are not limited to bleeding; infection; damage to surrounding nerves or vessels; persistent pain, stiffness; nonunion; malunion; post-traumatic arthritis; need for additional procedures; amputation; blood clots; pulmonary embolus; cardiac events; stroke; and the general risks of anesthesia including anesthetic death. We also reviewed postop protocol as well as limitations and expectations. Patient understands and desires to proceed with surgical intervention. Consent was obtained and the Right lower leg was marked.     Operative Procedure:  The patient was  transferred to the operating room, transferred to the OR table in a supine position then placed under general endotracheal anesthesia by the anesthesiology service. All bony prominences were appropriately padded.  Patient was secured to the table.  A nonsterile tourniquet was placed on the Right thigh.  An SCD was placed on the contralateral lower limb.  The patient was prepped and draped in usual sterile fashion.  A time-out was then called.  The patient, procedure, surgical site was verified and everyone was in agreement.  Preoperative IV antibiotics were administered. The surgical extremity was exsanguinated with an esmarch bandage and the tourniquet was inflated to 300mmHg.  An approximately 6 cm incision was made using a direct lateral approach over the distal fibula using a #15 Blade.  Dissection was taken deep to the level of the fracture site.  The sural nerve was noted to be an anatomic variant and more anterior that usual. This was dissected out and protected for the duration of the procedure.  After gaining adequate exposure with a combination of both blunt and sharp dissection, the spiral fracture was debrided satisfactorily.  This was then reduced with a combination of bony tenaculums.  K-wires were used for provisional fixation.  After confirming anatomic reduction both under direct visualization and fluoroscopy, a 2.7mm cortical screw was placed in a lag by technique fashion with adequate purchase.    Attention was then turned to lateral based distal fibular plate placement.  A 9 hole plate was chosen.  After adequate position was confirmed under fluoroscopy, this was fixed proximally with cortical screws and distally with locking screws.  Attention was then turned to the medial malleolus where an approximately 3 cm curvilinear incision was made over the anterior 3rd.  Dissection was taken deep down the level of the saphenous nerve and vein.  These were carefully dissected and retracted throughout the  duration of the procedure.  The medial malleolus fracture site was adequately debrided. After gaining anatomic reduction using combination of bony tenaculum and a dental pick, K-wires x2 were then placed.  After confirming satisfactory K-wire placement under fluoroscopy, these were overdrilled and 4.0 mm cannulated screws were then placed and tightened a sequential manner.  Overall satisfactory reduction and bony apposition was appreciated.    While the posterior malleolar fracture fragment was appropriately reduced at the articular margin, upon repeat stress testing there was some degree of residual clear space widening.  At this time decision was made to a proceed with syndesmotic reduction and fixation.  A periarticular syndesmotic clamp was then placed at the level just proximal to the tibiotalar joint.  The medial itz was placed at the anterior and central 1/3 junction.  After confirming reduction of the distal fibula within the incisura, a tightrope was then placed and sequentially tightened.  The reduction clamp was removed just prior to final cinching.  Residual FiberWire strands from the tightrope were cut.  Final fluoroscopy shots were taken confirming satisfactory reduction and stable fixation. The tourniquet was let down and hemostasis was obtained.  All wounds were copiously irrigated and closed in standard layered fashion including 2-0 Vicryl deep, 3-0 Monocryl subcuticularly, and 3-0 nylon on the skin.  Sterile dressings including Xeroform, 4 x 4 gauze, Webril, and a well-padded short leg splint in neutral dorsiflexion.  Patient was reversed from anesthesia and transferred to recovery in stable condition.     Reinier Riddle MD

## 2023-03-02 VITALS
BODY MASS INDEX: 36.8 KG/M2 | RESPIRATION RATE: 16 BRPM | HEART RATE: 85 BPM | SYSTOLIC BLOOD PRESSURE: 100 MMHG | TEMPERATURE: 98 F | HEIGHT: 62 IN | DIASTOLIC BLOOD PRESSURE: 57 MMHG | OXYGEN SATURATION: 98 % | WEIGHT: 200 LBS

## 2023-03-02 DIAGNOSIS — S82.851A CLOSED DISPLACED TRIMALLEOLAR FRACTURE OF RIGHT ANKLE, INITIAL ENCOUNTER: Primary | ICD-10-CM

## 2023-03-02 PROBLEM — S93.431A ANKLE SYNDESMOSIS DISRUPTION, RIGHT, INITIAL ENCOUNTER: Status: ACTIVE | Noted: 2023-03-02

## 2023-03-02 PROCEDURE — 63600175 PHARM REV CODE 636 W HCPCS: Mod: PO | Performed by: ANESTHESIOLOGY

## 2023-03-13 ENCOUNTER — TELEPHONE (OUTPATIENT)
Dept: ORTHOPEDICS | Facility: CLINIC | Age: 33
End: 2023-03-13
Payer: MEDICAID

## 2023-03-13 DIAGNOSIS — S82.851A CLOSED DISPLACED TRIMALLEOLAR FRACTURE OF RIGHT ANKLE, INITIAL ENCOUNTER: Primary | ICD-10-CM

## 2023-03-13 RX ORDER — OXYCODONE AND ACETAMINOPHEN 5; 325 MG/1; MG/1
1 TABLET ORAL
Qty: 42 TABLET | Refills: 0 | Status: SHIPPED | OUTPATIENT
Start: 2023-03-13 | End: 2023-03-20

## 2023-03-13 NOTE — TELEPHONE ENCOUNTER
----- Message from Ruth Yanez MA sent at 3/13/2023 10:06 AM CDT -----  Contact: pt  Wants to discuss post op pain   Call back

## 2023-03-13 NOTE — TELEPHONE ENCOUNTER
Throbbing pains, taking tylenol Q 6 hours. Has appt tomorrow. Can Dr Riddle send in more pain meds?   Informed her I will reach out to Dr Riddle as he is out today. Once I hear from him I will let her know.

## 2023-03-13 NOTE — TELEPHONE ENCOUNTER
I called Dr Riddle and he approved Percocet 5-325 to be called in. He will be calling it in today.  Pt informed Dr Riddle will call in rx

## 2023-03-14 ENCOUNTER — HOSPITAL ENCOUNTER (OUTPATIENT)
Dept: RADIOLOGY | Facility: HOSPITAL | Age: 33
Discharge: HOME | End: 2023-03-14
Attending: ORTHOPAEDIC SURGERY
Payer: MEDICAID

## 2023-03-14 ENCOUNTER — OFFICE VISIT (OUTPATIENT)
Dept: ORTHOPEDICS | Facility: CLINIC | Age: 33
End: 2023-03-14
Payer: MEDICAID

## 2023-03-14 DIAGNOSIS — S93.04XA ANKLE DISLOCATION, RIGHT, INITIAL ENCOUNTER: ICD-10-CM

## 2023-03-14 DIAGNOSIS — S82.851A CLOSED DISPLACED TRIMALLEOLAR FRACTURE OF RIGHT ANKLE, INITIAL ENCOUNTER: Primary | ICD-10-CM

## 2023-03-14 DIAGNOSIS — S82.851A CLOSED DISPLACED TRIMALLEOLAR FRACTURE OF RIGHT ANKLE, INITIAL ENCOUNTER: ICD-10-CM

## 2023-03-14 PROCEDURE — 99999 PR PBB SHADOW E&M-EST. PATIENT-LVL II: ICD-10-PCS | Mod: PBBFAC,,, | Performed by: ORTHOPAEDIC SURGERY

## 2023-03-14 PROCEDURE — 1159F PR MEDICATION LIST DOCUMENTED IN MEDICAL RECORD: ICD-10-PCS | Mod: CPTII,,, | Performed by: ORTHOPAEDIC SURGERY

## 2023-03-14 PROCEDURE — 1160F RVW MEDS BY RX/DR IN RCRD: CPT | Mod: CPTII,,, | Performed by: ORTHOPAEDIC SURGERY

## 2023-03-14 PROCEDURE — 1159F MED LIST DOCD IN RCRD: CPT | Mod: CPTII,,, | Performed by: ORTHOPAEDIC SURGERY

## 2023-03-14 PROCEDURE — 99024 POSTOP FOLLOW-UP VISIT: CPT | Mod: ,,, | Performed by: ORTHOPAEDIC SURGERY

## 2023-03-14 PROCEDURE — 73610 XR ANKLE COMPLETE 3 VIEW RIGHT: ICD-10-PCS | Mod: 26,RT,, | Performed by: RADIOLOGY

## 2023-03-14 PROCEDURE — 99999 PR PBB SHADOW E&M-EST. PATIENT-LVL II: CPT | Mod: PBBFAC,,, | Performed by: ORTHOPAEDIC SURGERY

## 2023-03-14 PROCEDURE — 1160F PR REVIEW ALL MEDS BY PRESCRIBER/CLIN PHARMACIST DOCUMENTED: ICD-10-PCS | Mod: CPTII,,, | Performed by: ORTHOPAEDIC SURGERY

## 2023-03-14 PROCEDURE — 99212 OFFICE O/P EST SF 10 MIN: CPT | Mod: PBBFAC,PN | Performed by: ORTHOPAEDIC SURGERY

## 2023-03-14 PROCEDURE — 73610 X-RAY EXAM OF ANKLE: CPT | Mod: TC,PO,RT

## 2023-03-14 PROCEDURE — 99024 PR POST-OP FOLLOW-UP VISIT: ICD-10-PCS | Mod: ,,, | Performed by: ORTHOPAEDIC SURGERY

## 2023-03-14 PROCEDURE — 73610 X-RAY EXAM OF ANKLE: CPT | Mod: 26,RT,, | Performed by: RADIOLOGY

## 2023-03-14 NOTE — PROGRESS NOTES
Status/Diagnosis: Right trimalleolar ankle fracture dislocation  Date of Surgery: 03/01/2023  Date of Injury: 02/19/2023  Return visit: 3/23/2023  X-rays on Return: none      Present History:  Estrellita Garcia is a 32 y.o. female who returns for 1st postoperative clinic visit.  Overall doing well.  Still taking Percocet intermittently as needed for pain.  Reports compliance with nonweightbearing status although has had to put her foot down to use the bathroom.  Taking aspirin for DVT prophylaxis.  Denies any drainage, fever, chills.  Vague history of heart arrhythmia, on atenolol.  Managed by Dr. Holcomb.  Vapes regularly with nicotine.  Works from home.      Past Medical History:   Diagnosis Date    Hypertension     Palpitations        Past Surgical History:   Procedure Laterality Date    APPLICATION OF SPLINT Right 2/23/2023    Procedure: APPLICATION, SPLINT;  Surgeon: Reinier Riddle MD;  Location: [facility] OR;  Service: Orthopedics;  Laterality: Right;    CLOSED REDUCTION Right 2/23/2023    Procedure: CLOSED REDUCTION;  Surgeon: Reinier Riddle MD;  Location: [facility] OR;  Service: Orthopedics;  Laterality: Right;  placement of short leg splint     uterine polyps  2014       Current Outpatient Medications   Medication Sig    acetaminophen (TYLENOL) 325 MG tablet Take 325 mg by mouth every 6 (six) hours as needed for Pain.    ALPRAZolam (XANAX) 0.25 MG tablet Take 0.25 mg by mouth daily as needed.    aspirin (ECOTRIN) 81 MG EC tablet Take 1 tablet (81 mg total) by mouth once daily.    atenoloL (TENORMIN) 25 MG tablet Take 1 tablet (25 mg total) by mouth once daily. (Patient taking differently: Take 25 mg by mouth every evening.)    dextroamphetamine-amphetamine (ADDERALL) 15 mg tablet Take 15 mg by mouth 2 (two) times daily.    EScitalopram oxalate (LEXAPRO) 20 MG tablet Take 20 mg by mouth every evening. Takes 30mg    oxyCODONE-acetaminophen (PERCOCET) 5-325 mg per tablet Take 1 tablet by mouth every 4 to 6 hours as  needed for Pain.     No current facility-administered medications for this visit.       Review of patient's allergies indicates:  No Known Allergies    Family History   Problem Relation Age of Onset    No Known Problems Mother     No Known Problems Father        Social History     Socioeconomic History    Marital status: Single   Tobacco Use    Smoking status: Some Days     Types: Vaping with nicotine    Smokeless tobacco: Never   Substance and Sexual Activity    Alcohol use: Yes     Comment: socially    Drug use: Never    Sexual activity: Yes       Physical exam:  There were no vitals filed for this visit.  There is no height or weight on file to calculate BMI.  General: In no apparent distress; well developed and well nourished.  HEENT: normocephalic; atraumatic.  Cardiovascular: regular rate.  Respiratory: no increased work of breathing.  Musculoskeletal:   Gait: unable to assess  Inspection:  Medial and lateral based surgical incisions healing well with nylon sutures in place.  Patient does have previously noted area of 3 x 1 cm abrasion over the anteromedial ankle now with overlying eschar.  This is superficial in nature with no deep extension.  No adjacent warmth or erythema.  No signs or symptoms of infection.  This was able to be removed by myself today with only 1 small area of bleeding medially measuring 2 x 4 mm.  Somewhat limited ankle dorsiflexion due to patient pain/guarding.  Range of motion from neutral dorsiflexion to 30° plantar flexion with prompting. Gross motor and sensory function intact distally.  Brisk cap refill all 5 digits.                   Imaging Studies/Outside documentation:  I have ordered/reviewed/interpreted the following images/outside documentation:  1. NWB 3-views Right ankle:  Trimalleolar ankle fracture status post medial and lateral malleolar fracture ORIF and syndesmotic reduction with tight rope.  Overall alignment well maintained.  Congruent ankle mortise.  No evidence of  implant loosening or failure.     Assessment:  Estrellita Garcia is a 32 y.o. female with Right trimalleolar ankle fracture dislocation.     Plan:   Clinical and radiographic findings were discussed.  Superficial eschar over the anteromedial ankle removed today.  We will place Betadine paint over the small residual abrasion as well as the surgical sites.  Retained sutures for now.  Patient be placed into a tall boot to be worn at all times except for showers and home ankle range of motion exercises.  Continue strict nonweightbearing right lower extremity.  Continue aspirin for DVT prophylaxis.  Return to clinic on 03/23 for repeat evaluation and wound check.  No new x-ray.  Patient was informed that narcotic refill on 03/13 would be her last.        This note was created using voice recognition software and may contain grammatical errors.

## 2023-03-23 ENCOUNTER — OFFICE VISIT (OUTPATIENT)
Dept: ORTHOPEDICS | Facility: CLINIC | Age: 33
End: 2023-03-23
Payer: MEDICAID

## 2023-03-23 DIAGNOSIS — S82.851A CLOSED DISPLACED TRIMALLEOLAR FRACTURE OF RIGHT ANKLE, INITIAL ENCOUNTER: Primary | ICD-10-CM

## 2023-03-23 PROCEDURE — 99024 PR POST-OP FOLLOW-UP VISIT: ICD-10-PCS | Mod: ,,, | Performed by: ORTHOPAEDIC SURGERY

## 2023-03-23 PROCEDURE — 1160F RVW MEDS BY RX/DR IN RCRD: CPT | Mod: CPTII,,, | Performed by: ORTHOPAEDIC SURGERY

## 2023-03-23 PROCEDURE — 99999 PR PBB SHADOW E&M-EST. PATIENT-LVL I: CPT | Mod: PBBFAC,,, | Performed by: ORTHOPAEDIC SURGERY

## 2023-03-23 PROCEDURE — 1159F PR MEDICATION LIST DOCUMENTED IN MEDICAL RECORD: ICD-10-PCS | Mod: CPTII,,, | Performed by: ORTHOPAEDIC SURGERY

## 2023-03-23 PROCEDURE — 1159F MED LIST DOCD IN RCRD: CPT | Mod: CPTII,,, | Performed by: ORTHOPAEDIC SURGERY

## 2023-03-23 PROCEDURE — 1160F PR REVIEW ALL MEDS BY PRESCRIBER/CLIN PHARMACIST DOCUMENTED: ICD-10-PCS | Mod: CPTII,,, | Performed by: ORTHOPAEDIC SURGERY

## 2023-03-23 PROCEDURE — 99999 PR PBB SHADOW E&M-EST. PATIENT-LVL I: ICD-10-PCS | Mod: PBBFAC,,, | Performed by: ORTHOPAEDIC SURGERY

## 2023-03-23 PROCEDURE — 99211 OFF/OP EST MAY X REQ PHY/QHP: CPT | Mod: PBBFAC,PN | Performed by: ORTHOPAEDIC SURGERY

## 2023-03-23 PROCEDURE — 99024 POSTOP FOLLOW-UP VISIT: CPT | Mod: ,,, | Performed by: ORTHOPAEDIC SURGERY

## 2023-03-23 RX ORDER — HYDROCODONE BITARTRATE AND ACETAMINOPHEN 5; 325 MG/1; MG/1
1 TABLET ORAL EVERY 6 HOURS PRN
Qty: 28 TABLET | Refills: 0 | Status: SHIPPED | OUTPATIENT
Start: 2023-03-23 | End: 2023-03-30

## 2023-03-23 NOTE — PROGRESS NOTES
Status/Diagnosis: Right trimalleolar ankle fracture dislocation  Date of Surgery: 03/01/2023  Date of Injury: 02/19/2023  Return visit: 3 weeks  X-rays on Return: NWB 3-views Right ankle      Present History:  Estrellita Garcia is a 32 y.o. female who returns for routine postoperative visit and wound check.  Still taking Percocet intermittently as needed for pain.  Still with complaints of throbbing pain about the ankle.  Denies any drainage, fever, chills.  Reports compliance with nonweightbearing status.  Continues to work from home.  Taking aspirin for DVT prophylaxis.  Vague history of heart arrhythmia, on atenolol.  Managed by Dr. Holcomb.  Vapes regularly with nicotine.  Works from home.      Past Medical History:   Diagnosis Date    Hypertension     Palpitations        Past Surgical History:   Procedure Laterality Date    APPLICATION OF SPLINT Right 2/23/2023    Procedure: APPLICATION, SPLINT;  Surgeon: Reinier Riddle MD;  Location: [facility] OR;  Service: Orthopedics;  Laterality: Right;    CLOSED REDUCTION Right 2/23/2023    Procedure: CLOSED REDUCTION;  Surgeon: Reinier Riddle MD;  Location: [facility] OR;  Service: Orthopedics;  Laterality: Right;  placement of short leg splint     FIXATION OF SYNDESMOSIS OF ANKLE Right 3/1/2023    Procedure: FIXATION, SYNDESMOSIS, ANKLE;  Surgeon: Reinier Riddle MD;  Location: [facility] OR;  Service: Orthopedics;  Laterality: Right;    OPEN REDUCTION AND INTERNAL FIXATION (ORIF) OF INJURY OF ANKLE Right 3/1/2023    Procedure: ORIF, ANKLE;  Surgeon: Reinier Riddle MD;  Location: [facility] OR;  Service: Orthopedics;  Laterality: Right;    uterine polyps  2014       Current Outpatient Medications   Medication Sig    acetaminophen (TYLENOL) 325 MG tablet Take 325 mg by mouth every 6 (six) hours as needed for Pain.    ALPRAZolam (XANAX) 0.25 MG tablet Take 0.25 mg by mouth daily as needed.    aspirin (ECOTRIN) 81 MG EC tablet Take 1 tablet (81 mg total) by mouth once daily.    atenoloL  (TENORMIN) 25 MG tablet Take 1 tablet (25 mg total) by mouth once daily. (Patient taking differently: Take 25 mg by mouth every evening.)    dextroamphetamine-amphetamine (ADDERALL) 15 mg tablet Take 15 mg by mouth 2 (two) times daily.    EScitalopram oxalate (LEXAPRO) 20 MG tablet Take 20 mg by mouth every evening. Takes 30mg    ondansetron (ZOFRAN) 8 MG tablet TAKE 1 TABLET(8 MG) BY MOUTH EVERY 12 HOURS AS NEEDED FOR NAUSEA     No current facility-administered medications for this visit.       Review of patient's allergies indicates:  No Known Allergies    Family History   Problem Relation Age of Onset    No Known Problems Mother     No Known Problems Father        Social History     Socioeconomic History    Marital status: Single   Tobacco Use    Smoking status: Some Days     Types: Vaping with nicotine    Smokeless tobacco: Never   Substance and Sexual Activity    Alcohol use: Yes     Comment: socially    Drug use: Never    Sexual activity: Yes       Physical exam:  There were no vitals filed for this visit.  There is no height or weight on file to calculate BMI.  General: In no apparent distress; well developed and well nourished.  HEENT: normocephalic; atraumatic.  Cardiovascular: regular rate.  Respiratory: no increased work of breathing.  Musculoskeletal:   Gait: unable to assess  Inspection:  Medial and lateral based surgical incisions well healed with nylon sutures in place.  Very small area of residual eschar about the anteromedial ankle but with significant improvement.  This is superficial in nature with no deep extension.  No adjacent warmth or erythema.  No signs or symptoms of infection.  Somewhat limited ankle dorsiflexion due to patient pain/guarding.  Range of motion from neutral dorsiflexion to 30° plantar flexion with prompting. Gross motor and sensory function intact distally.  Brisk cap refill all 5 digits.                   Imaging Studies/Outside documentation:  no new imaging  today.      Assessment:  Estrellita Garcia is a 32 y.o. female with Right trimalleolar ankle fracture dislocation.     Plan:   Clinical and radiographic findings were discussed.  Sutures removed and Steri-Strips placed.  We will continue to monitor the area of small residual eschar closely.  Patient may shower but no baths or submersion of the wound.  Patient to continue boot wear to allow her to remove it for sleep, hygiene, home ankle range of motion exercises.  We will provide 1 additional prescription for p.o. hydrocodone-patient informed that this would be her last.  May continue over-the-counter Tylenol as needed for pain.  No p.o. NSAIDs.  Continue aspirin for DVT prophylaxis.  Patient voiced understanding.  All questions were answered.  Return to clinic in 3 weeks for repeat evaluation and x-ray.  Possible initiation of weight-bearing at that time.          This note was created using voice recognition software and may contain grammatical errors.

## 2023-03-28 ENCOUNTER — PATIENT MESSAGE (OUTPATIENT)
Dept: ORTHOPEDICS | Facility: CLINIC | Age: 33
End: 2023-03-28
Payer: MEDICAID

## 2023-04-13 ENCOUNTER — NURSE TRIAGE (OUTPATIENT)
Dept: ADMINISTRATIVE | Facility: CLINIC | Age: 33
End: 2023-04-13
Payer: MEDICAID

## 2023-04-13 DIAGNOSIS — S82.851A CLOSED DISPLACED TRIMALLEOLAR FRACTURE OF RIGHT ANKLE, INITIAL ENCOUNTER: Primary | ICD-10-CM

## 2023-04-14 ENCOUNTER — PATIENT MESSAGE (OUTPATIENT)
Dept: ORTHOPEDICS | Facility: CLINIC | Age: 33
End: 2023-04-14
Payer: MEDICAID

## 2023-04-14 NOTE — TELEPHONE ENCOUNTER
Following up in regards to pt msg voicing concerns for leg. Pt made aware that Dr. Riddle is out of office for the day but that we will update him with what's going on. Pt advised to go to ED if she is still having concerns about the symptoms she is experiencing. Pt states that she is going to go to the ED and will keep us in the loop.

## 2023-04-14 NOTE — TELEPHONE ENCOUNTER
Had surgery on her ankle on March 1. Looked at medications and leg keeps falling asleep on the surgical side, some swelling at the ankle and the foot is purple.  Reason for Disposition   Patient sounds very sick or weak to the triager    Additional Information   Negative: [1] SEVERE weakness (i.e., unable to walk or barely able to walk, requires support) AND [2] new-onset or worsening   Negative: [1] Weakness (i.e., paralysis, loss of muscle strength) of the face, arm / hand, or leg / foot on one side of the body AND [2] sudden onset AND [3] present now (Exception: Bell's palsy suspected [i.e., weakness only on one side of the face, developing over hours to days, no other symptoms])   Negative: [1] Numbness (i.e., loss of sensation) of the face, arm / hand, or leg / foot on one side of the body AND [2] sudden onset AND [3] present now   Negative: [1] Loss of speech or garbled speech AND [2] sudden onset AND [3] present now   Negative: Difficult to awaken or acting confused (e.g., disoriented, slurred speech)   Negative: Sounds like a life-threatening emergency to the triager   Negative: Headache  (and neurologic deficit)   Negative: [1] Back pain AND [2] numbness (loss of sensation) in groin or rectal area   Negative: [1] Unable to urinate (or only a few drops) > 4 hours AND [2] bladder feels very full (e.g., palpable bladder or strong urge to urinate)   Negative: [1] Loss of bladder or bowel control (urine or bowel incontinence; wetting self, leaking stool) AND [2] new-onset   Negative: [1] Weakness (i.e., paralysis, loss of muscle strength) of the face, arm / hand, or leg / foot on one side of the body AND [2] sudden onset AND [3] brief (now gone)   Negative: [1] Numbness (i.e., loss of sensation) of the face, arm / hand, or leg / foot on one side of the body AND [2] sudden onset AND [3] brief (now gone)   Negative: [1] Loss of speech or garbled speech AND [2] sudden onset AND [3] brief (now gone)    Negative: Bell's palsy suspected (i.e., weakness on only one side of the face, developing over hours to days, no other symptoms)    Protocols used: Neurologic Deficit-A-AH

## 2023-04-18 ENCOUNTER — HOSPITAL ENCOUNTER (OUTPATIENT)
Dept: RADIOLOGY | Facility: HOSPITAL | Age: 33
Discharge: HOME | End: 2023-04-18
Attending: ORTHOPAEDIC SURGERY
Payer: MEDICAID

## 2023-04-18 ENCOUNTER — OFFICE VISIT (OUTPATIENT)
Dept: ORTHOPEDICS | Facility: CLINIC | Age: 33
End: 2023-04-18
Payer: MEDICAID

## 2023-04-18 DIAGNOSIS — S82.851A CLOSED DISPLACED TRIMALLEOLAR FRACTURE OF RIGHT ANKLE, INITIAL ENCOUNTER: Primary | ICD-10-CM

## 2023-04-18 DIAGNOSIS — S82.851A CLOSED DISPLACED TRIMALLEOLAR FRACTURE OF RIGHT ANKLE, INITIAL ENCOUNTER: ICD-10-CM

## 2023-04-18 PROCEDURE — 73610 XR ANKLE COMPLETE 3 VIEW RIGHT: ICD-10-PCS | Mod: 26,RT,, | Performed by: RADIOLOGY

## 2023-04-18 PROCEDURE — 73610 X-RAY EXAM OF ANKLE: CPT | Mod: 26,RT,, | Performed by: RADIOLOGY

## 2023-04-18 PROCEDURE — 1160F RVW MEDS BY RX/DR IN RCRD: CPT | Mod: CPTII,,, | Performed by: ORTHOPAEDIC SURGERY

## 2023-04-18 PROCEDURE — 99024 PR POST-OP FOLLOW-UP VISIT: ICD-10-PCS | Mod: ,,, | Performed by: ORTHOPAEDIC SURGERY

## 2023-04-18 PROCEDURE — 73610 X-RAY EXAM OF ANKLE: CPT | Mod: TC,PO,RT

## 2023-04-18 PROCEDURE — 99024 POSTOP FOLLOW-UP VISIT: CPT | Mod: ,,, | Performed by: ORTHOPAEDIC SURGERY

## 2023-04-18 PROCEDURE — 1159F PR MEDICATION LIST DOCUMENTED IN MEDICAL RECORD: ICD-10-PCS | Mod: CPTII,,, | Performed by: ORTHOPAEDIC SURGERY

## 2023-04-18 PROCEDURE — 1159F MED LIST DOCD IN RCRD: CPT | Mod: CPTII,,, | Performed by: ORTHOPAEDIC SURGERY

## 2023-04-18 PROCEDURE — 99212 OFFICE O/P EST SF 10 MIN: CPT | Mod: PBBFAC,PN | Performed by: ORTHOPAEDIC SURGERY

## 2023-04-18 PROCEDURE — 1160F PR REVIEW ALL MEDS BY PRESCRIBER/CLIN PHARMACIST DOCUMENTED: ICD-10-PCS | Mod: CPTII,,, | Performed by: ORTHOPAEDIC SURGERY

## 2023-04-18 PROCEDURE — 99999 PR PBB SHADOW E&M-EST. PATIENT-LVL II: CPT | Mod: PBBFAC,,, | Performed by: ORTHOPAEDIC SURGERY

## 2023-04-18 PROCEDURE — 99999 PR PBB SHADOW E&M-EST. PATIENT-LVL II: ICD-10-PCS | Mod: PBBFAC,,, | Performed by: ORTHOPAEDIC SURGERY

## 2023-04-18 NOTE — PROGRESS NOTES
Status/Diagnosis: Right trimalleolar ankle fracture dislocation  Date of Surgery: 03/01/2023  Date of Injury: 02/19/2023  Return visit: 1 month  X-rays on Return: WB 3-views Right ankle      Present History:  Estrellita Garcia is a 32 y.o. female who returns for routine postoperative visit.  All previous surgical scars and the area of abrasion of the anteromedial ankle are all well healed.  Reports compliance with nonweightbearing status.   Continues to work from home.  Taking aspirin for DVT prophylaxis.  She did have a sciatica flare-up on Friday causing her to go to the emergency department however this is now resolved.  Vague history of heart arrhythmia, on atenolol.  Managed by Dr. Holcomb.  Vapes regularly with nicotine.  Works from home.      Past Medical History:   Diagnosis Date    Hypertension     Palpitations        Past Surgical History:   Procedure Laterality Date    APPLICATION OF SPLINT Right 2/23/2023    Procedure: APPLICATION, SPLINT;  Surgeon: Reinier Riddle MD;  Location: [facility] OR;  Service: Orthopedics;  Laterality: Right;    CLOSED REDUCTION Right 2/23/2023    Procedure: CLOSED REDUCTION;  Surgeon: Reinier Riddle MD;  Location: [facility] OR;  Service: Orthopedics;  Laterality: Right;  placement of short leg splint     FIXATION OF SYNDESMOSIS OF ANKLE Right 3/1/2023    Procedure: FIXATION, SYNDESMOSIS, ANKLE;  Surgeon: Reinier Riddle MD;  Location: [facility] OR;  Service: Orthopedics;  Laterality: Right;    OPEN REDUCTION AND INTERNAL FIXATION (ORIF) OF INJURY OF ANKLE Right 3/1/2023    Procedure: ORIF, ANKLE;  Surgeon: Reinier Riddle MD;  Location: [facility] OR;  Service: Orthopedics;  Laterality: Right;    uterine polyps  2014       Current Outpatient Medications   Medication Sig    acetaminophen (TYLENOL) 325 MG tablet Take 325 mg by mouth every 6 (six) hours as needed for Pain.    ALPRAZolam (XANAX) 0.25 MG tablet Take 0.25 mg by mouth daily as needed.    aspirin (ECOTRIN) 81 MG EC tablet Take 1  tablet (81 mg total) by mouth once daily.    atenoloL (TENORMIN) 25 MG tablet Take 1 tablet (25 mg total) by mouth once daily. (Patient taking differently: Take 25 mg by mouth every evening.)    dextroamphetamine-amphetamine (ADDERALL) 15 mg tablet Take 15 mg by mouth 2 (two) times daily.    EScitalopram oxalate (LEXAPRO) 20 MG tablet Take 20 mg by mouth every evening. Takes 30mg    methylPREDNISolone (MEDROL DOSEPACK) 4 mg tablet TAKE PER PACKAGE INSTRUCTIONS FOR THE FULL COURSE OF THERAPY.    ondansetron (ZOFRAN) 8 MG tablet TAKE 1 TABLET(8 MG) BY MOUTH EVERY 12 HOURS AS NEEDED FOR NAUSEA     No current facility-administered medications for this visit.       Review of patient's allergies indicates:  No Known Allergies    Family History   Problem Relation Age of Onset    No Known Problems Mother     No Known Problems Father        Social History     Socioeconomic History    Marital status: Single   Tobacco Use    Smoking status: Some Days     Types: Vaping with nicotine    Smokeless tobacco: Never   Substance and Sexual Activity    Alcohol use: Yes     Comment: socially    Drug use: Never    Sexual activity: Yes       Physical exam:  There were no vitals filed for this visit.  There is no height or weight on file to calculate BMI.  General: In no apparent distress; well developed and well nourished.  HEENT: normocephalic; atraumatic.  Cardiovascular: regular rate.  Respiratory: no increased work of breathing.  Musculoskeletal:   Gait: unable to assess  Inspection:  Medial and lateral based surgical incisions well healed.  Previously noted area of residual eschar is now with well healed scar. No signs or symptoms of infection.  Mild residual tenderness about both fracture sites.  Range of motion from neutral dorsiflexion to 45° plantar flexion with prompting. Gross motor and sensory function intact distally.  Brisk cap refill all 5 digits.                   Imaging Studies/Outside documentation:  NWB 3-views Right  ankle:   Trimalleolar ankle fracture status post medial and lateral malleolar fracture ORIF and syndesmotic reduction with tight rope.  Overall alignment well maintained.  Congruent ankle mortise.  Early evidence of bony bridging present.  No evidence of implant loosening or failure.      Assessment:  Estrellita Garcia is a 32 y.o. female with Right trimalleolar ankle fracture dislocation.     Plan:   Clinical and radiographic findings were discussed.  We will initiate physical therapy per protocol for 25% per week incremental weight-bearing starting the week of 4/23.  Okay for baths/submersion of the wound at this time.  Tylenol as needed for pain.  Given strict instructions for no weight-bearing until starting physical therapy.  Patient voiced understanding.  All questions were answered.  May return to clinic in 1 month for repeat evaluation and x-ray.        This note was created using voice recognition software and may contain grammatical errors.

## 2023-04-27 ENCOUNTER — CLINICAL SUPPORT (OUTPATIENT)
Dept: REHABILITATION | Facility: HOSPITAL | Age: 33
End: 2023-04-27
Attending: ORTHOPAEDIC SURGERY
Payer: MEDICAID

## 2023-04-27 DIAGNOSIS — R26.89 DECREASED FUNCTIONAL MOBILITY: ICD-10-CM

## 2023-04-27 DIAGNOSIS — M25.60 DECREASED RANGE OF MOTION: ICD-10-CM

## 2023-04-27 DIAGNOSIS — S82.851A CLOSED DISPLACED TRIMALLEOLAR FRACTURE OF RIGHT ANKLE, INITIAL ENCOUNTER: ICD-10-CM

## 2023-04-27 DIAGNOSIS — M25.571 CHRONIC PAIN OF RIGHT ANKLE: ICD-10-CM

## 2023-04-27 DIAGNOSIS — R26.9 GAIT ABNORMALITY: ICD-10-CM

## 2023-04-27 DIAGNOSIS — G89.29 CHRONIC PAIN OF RIGHT ANKLE: ICD-10-CM

## 2023-04-27 PROCEDURE — 97110 THERAPEUTIC EXERCISES: CPT | Mod: PN

## 2023-04-27 PROCEDURE — 97161 PT EVAL LOW COMPLEX 20 MIN: CPT | Mod: PN

## 2023-04-28 PROBLEM — R26.89 DECREASED FUNCTIONAL MOBILITY: Status: ACTIVE | Noted: 2023-04-28

## 2023-04-28 PROBLEM — R26.9 GAIT ABNORMALITY: Status: ACTIVE | Noted: 2023-04-28

## 2023-04-28 PROBLEM — G89.29 CHRONIC PAIN OF RIGHT ANKLE: Status: ACTIVE | Noted: 2023-04-28

## 2023-04-28 PROBLEM — M25.60 DECREASED RANGE OF MOTION: Status: ACTIVE | Noted: 2023-04-28

## 2023-04-28 PROBLEM — M25.571 CHRONIC PAIN OF RIGHT ANKLE: Status: ACTIVE | Noted: 2023-04-28

## 2023-04-28 NOTE — PLAN OF CARE
[facility]  Physical Therapy Initial Evaluation    Name: Estrellita Garcia  Clinic Number: [id number]    Therapy Diagnosis:   Encounter Diagnoses   Name Primary?    Closed displaced trimalleolar fracture of right ankle, initial encounter     Chronic pain of right ankle     Decreased range of motion     Gait abnormality     Decreased functional mobility      Physician: Reinier Riddle MD    Physician Orders: PT Eval and Treat 1-2 times per week for 6 to 8 weeks per fracture protocol.  PT to start week of 04/23/2023.   25% WB in boot week 1; 50% WB in boot week 2; 75% WB in boot week 3; 100% WB in boot week 4.  Active/Active assist ankle ROM. May start slow, gradual strengthening once full WB in boot. Modalities as needed.  Stay in boot until next Ortho clinic visit.  Medical Diagnosis from Referral: Closed displaced trimalleolar fracture of right ankle, initial encounter  Evaluation Date: 4/27/2023  Authorization Period Expiration: 4/17/24  Plan of Care Expiration: 6/23/23  Visit # / Visits authorized: 1/ 16    Time In: 1:45  Time Out: 2:30  Total Billable Time: 45 minutes    Precautions: Standard    Subjective   Date of onset: 2/20/23 had a fall, Right trimalleolar ankle fracture open reduction internal fixation without posterior lip fixation. 55890.  Open reduction internal fixation of syndesmotic disruption. 29245. On 3/1/23  History of current condition - Estrellita reports: pt states she has been in a boot and NWB.  She went to ER on 4/14/23 due to burning, numbness to and tingling to R buttock and down posterior R LE.  She was given meds which helped some, but she continues to have pain to R low back and symptoms into R LE     Medical History:   Past Medical History:   Diagnosis Date    Hypertension     Palpitations        Surgical History:   Estrellita Garcia  has a past surgical history that includes uterine polyps (2014); Closed reduction (Right, 2/23/2023); Application of splint (Right,  2/23/2023); Open reduction and internal fixation (ORIF) of injury of ankle (Right, 3/1/2023); and Fixation of syndesmosis of ankle (Right, 3/1/2023).    Medications:   Estrellita has a current medication list which includes the following prescription(s): acetaminophen, alprazolam, aspirin, atenolol, dextroamphetamine-amphetamine, escitalopram oxalate, methylprednisolone, and ondansetron.    Allergies:   Review of patient's allergies indicates:  No Known Allergies     Imaging, xray: There are postoperative changes of ORIF of a trimalleolar right ankle fracture.  There is a syndesmotic tight rope device in place.  The ankle mortise width is symmetric.  No talar dome osteochondral lesion.  There has been interval progression of healing when compared to the previous study.  No new fractures.  There is soft tissue swelling about the ankle/hindfoot.       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    Pain:  Current 2/10, worst 5/10, best 2/10   Location: medial R ankle pain and pain to L Achilles   Description: Aching and Tight  Aggravating Factors: sitting with foot hanging  Easing Factors: Tylenol    Pts goals: be able to walk without pain and work out    Objective       Observation: pt is pleasant and cooperative, R boot    Pt ambulated into clinic with B axillary crutches and R boot, NWB R LE mod I    Ankle Range of Motion: AROM  Ankle Left Right   Dorsiflexion 4 (0 knee flex) -4 pain (-8 pain)   Plantarflexion 73  40 pain   Inversion 32  11    Eversion 16  3           Strength:    Lower Extremity Strength  Right LE  Left LE    Knee extension: 4-/5 Knee extension: 4+/5   Knee flexion: 3+/5 Knee flexion: 4/5   Hip flexion: 4-/5 Hip flexion: 4+/5   Hip extension:  3-/5 Hip extension:  4/5   Hip abduction: 4/5 Hip abduction: 4/5   Hip adduction: 3/5 Hip adduction 3/5        MMT: R ankle not tested due to MD protocol   Ankle Left   Dorsiflexion 5/5   Plantarflexion 5/5   Inversion 5/5   Eversion 5/5     R ankle incision is closed and healed, no signs of infection    Sensation: grossly intact to light touch      CMS Impairment/Limitation/Restriction for FOTO Ankle Survey  Status Limitation G-Code CMS Severity Modifier  Intake 26% 74% Current Status CL - At least 60 percent but less than 80 percent  Predicted 57% 43% Goal Status+ CK - At least 40 percent but less than 60 percent         TREATMENT   Treatment Time In: 2:15  Treatment Time Out: 2:30  Total Treatment time separate from Evaluation: 15 minutes    Estrellita received therapeutic exercises to develop ROM and flexibility for 15 minutes including:  Contract relax of R piriformis  Modified push/pull (L hip flex, R hip ext into mat with LE extended due to s/p R ankle ORIF)  Piriformis stretch 3x20 sec  R ankle AROM DF/PF, Inv/Sayda in pain free ROM  R ankle ABCs   Gastroc stretch in long sit with strap 3x20 sec  Soleus stretch seated in chair (slide foot back) 3x20 sec    Gait training: pt educated on ambulation with 25% WB to R LE in boot with axillary crutches.  Pt demonstrated understanding by end of session    May add: BAPS, towel curls, toe yoga    Estrellita received cold pack for 8 minutes to R ankle for decreased pain and edema.    Home Exercises and Patient Education Provided    Education provided:   - role of PT  -importance of compliance with HEP  -perform exercises within pain free range  -ice and elevate for decreased edema  Pt gave verbal understanding to all education provided     Written Home Exercises Provided: yes.  Exercises were reviewed and Estrellita was able to demonstrate them prior to the end of the session.  Estrellita demonstrated good  understanding of the education provided.     See EMR under Patient Instructions for exercises  provided 4/27/23.    Assessment   Estrellita is a 32 y.o. female referred to outpatient Physical Therapy with a medical diagnosis of Closed displaced trimalleolar fracture of right ankle, initial encounter. Pt presents with R ankle pain, decreased R ankle ROM, gait abnormality, decreased LE strength, decreased functional mobility.  She was able to perform all exercises without pain.  She will continue to benefit from PT for R ankle ROM, gait training, progressive increase of R LE WBing per MD protocol.     Pt prognosis is Good.   Pt will benefit from skilled outpatient Physical Therapy to address the deficits stated above and in the chart below, provide pt/family education, and to maximize pt's level of independence.     Plan of care discussed with patient: Yes  Pt's spiritual, cultural and educational needs considered and patient is agreeable to the plan of care and goals as stated below:     Anticipated Barriers for therapy: dependent on transportation    Medical Necessity is demonstrated by the following  History  Co-morbidities and personal factors that may impact the plan of care Co-morbidities:   none    Personal Factors:   no deficits     low   Examination  Body Structures and Functions, activity limitations and participation restrictions that may impact the plan of care Body Regions:   lower extremities    Body Systems:    ROM  strength  balance  gait  edema  scar formation    Participation Restrictions:        Activity limitations:   Learning and applying knowledge  no deficits    General Tasks and Commands  no deficits    Communication  no deficits    Mobility  walking    Self care  washing oneself (bathing, drying, washing hands)  caring for body parts (brushing teeth, shaving, grooming)  toileting    Domestic Life  shopping  cooking  doing house work (cleaning house, washing dishes, laundry)    Interactions/Relationships  no deficits    Life Areas  no deficits    Community and Social Life  community  life  recreation and leisure         high   Clinical Presentation stable and uncomplicated low   Decision Making/ Complexity Score: low     GOALS: Short Term Goals:  4 weeks (progressing, not met)  1.Report decreased    R ankle    pain  <   / =  2  /10  to increase tolerance for ADLs.  2. Increased R ankle DF AROM to 0 deg for increased ease with ADL and work activities.  3. Increased LE strength by 1/3 muscle grade  to increase tolerance for ADL and work activities.  4. Increased R ankle PF AROM to 60 deg for increased ease with ADL and work activities.   5. Pt to tolerate HEP to improve ROM and independence with ADL's  6. Increased R ankle Inv AROM to 20 deg for increased ease with ADL and work activities.    Long Term Goals: 8 weeks (progressing, not met)  1.Report decreased    R ankle    pain  <   / =  0  /10  to increase tolerance for ADLs  2. Increased R ankle DF AROM to 2-5 deg for increased ease with ADL and work activities.  3. Increased LE strength by 1 muscle grade  to increase tolerance for ADL and work activities.  4. Pt will ambulate 150 ft without AD independently.   5. Pt to tolerate HEP to improve ROM and independence with ADL's  6. Pt will perform SLS for at least 10 sec for increased ease ambulating on uneven surfaces.    Plan   Plan of care Certification: 4/27/2023 to 6/23/23.    Outpatient Physical Therapy 1 time weekly for 4 weeks, then 2 times per week for 4 weeks to include the following interventions: Electrical Stimulation  , Gait Training, Manual Therapy, Moist Heat/ Ice, Neuromuscular Re-ed, Patient Education, Self Care, Therapeutic Activities, Therapeutic Exercise, and dry needling.     Viry Waite, PT

## 2023-05-03 NOTE — PROGRESS NOTES
[facility]   Physical Therapy Treatment Note      Name: Estrellita Garcia  Clinic Number: [id number]    Therapy Diagnosis:   Encounter Diagnoses   Name Primary?    Chronic pain of right ankle Yes    Decreased range of motion     Gait abnormality     Decreased functional mobility      Physician: Reinier Riddle MD    Visit Date: 5/4/2023    Physician Orders: PT Eval and Treat 1-2 times per week for 6 to 8 weeks per fracture protocol.  PT to start week of 04/23/2023.   25% WB in boot week 1; 50% WB in boot week 2; 75% WB in boot week 3; 100% WB in boot week 4.  Active/Active assist ankle ROM. May start slow, gradual strengthening once full WB in boot. Modalities as needed.  Stay in boot until next Ortho clinic visit.  Medical Diagnosis from Referral: Closed displaced trimalleolar fracture of right ankle, initial encounter  Evaluation Date: 4/27/2023  Authorization Period Expiration: 4/17/24  Plan of Care Expiration: 6/23/23  Visit # / Visits authorized: 2/ 16    Time In: 10:45  Time Out: 11:40  Total Billable Time: 45 minutes + ice    Precautions: Standard  PTA Visit #: 0/5     Subjective     Pt reports: she gets pain to medial R ankle at times.  She states if she is rushing to the restroom she will go without her boot.  Her medial R ankle incision is sensitive to touch.  She was compliant with home exercise program.  Response to previous treatment: did well  Functional change: too soon to tell    Pain: 0/10  Location: right ankle     Objective      Objective Measures updated at progress report unless specified.     Treatment     Estrellita received the treatments listed below:      therapeutic exercises to develop strength, endurance, ROM, and flexibility for 35 minutes including:  Contract relax of R piriformis  Modified push/pull (L hip flex, R hip ext into mat with LE extended due to s/p R ankle ORIF)  Piriformis stretch 3x20 sec  R ankle AROM DF/PF, Inv/Sayda in pain free ROM  R ankle ABCs   NP  Gastroc stretch in long sit with strap 3x20 sec  NP Soleus stretch seated in chair (slide foot back) 3x20 sec  BAPS L2 (DF/PF, Inv/lloyd) x10 ea  Towel curls x15, 5 sec hold  Toe yoga x10    Gait training: pt educated on ambulation with 50% WB to R LE in boot with axillary crutches.  Pt demonstrated understanding by end of session      manual therapy techniques: for 10 minutes, including:  Desensitization techniques to medial R ankle scar with pillow case and then with towel  Scar mobilization to medial R ankle scar (CW/CCW, towards midline)  Pt educated on self desensitization and scar massage  Pt rec'd scar tape to medial R ankle scar    Estrellita received cold pack for 8 minutes to R ankle for decreased pain and edema.    Patient Education and Home Exercises       Education provided:   - importance of compliance with crutches and wearing boot for ambulation as ankle continues to heal per MD orders  -perform all ex in pain free ROM  Pt gave verbal understanding to all education provided     Written Home Exercises Provided: yes. Exercises were reviewed and Estrellita was able to demonstrate them prior to the end of the session.  Estrellita demonstrated good  understanding of the education provided. See EMR under Patient Instructions for exercises provided during therapy sessions    Assessment     Pt presented with hypersensitivity to medial R ankle scar.  She was able to tolerate desensitization with pillow case and towel.  Decreased scar mobility to medial R ankle scar, which loosened somewhat with scar mobs.  She is improving with increased R ankle ROM.  She will continue to benefit from PT for improved R ankle ROM, gait training, and strength training for improved functional mobility.    Estrellita Is progressing well towards her goals.   Pt prognosis is Good.     Pt will continue to benefit from skilled outpatient physical therapy to address the deficits listed in the problem list box on initial evaluation, provide pt/family  education and to maximize pt's level of independence in the home and community environment.     Pt's spiritual, cultural and educational needs considered and pt agreeable to plan of care and goals.     Anticipated barriers to physical therapy: pt has not been completely compliant with use of boot and crutches.    Goals:   Short Term Goals:  4 weeks (progressing, not met)  1.Report decreased    R ankle    pain  <   / =  2  /10  to increase tolerance for ADLs.  2. Increased R ankle DF AROM to 0 deg for increased ease with ADL and work activities.  3. Increased LE strength by 1/3 muscle grade  to increase tolerance for ADL and work activities.  4. Increased R ankle PF AROM to 60 deg for increased ease with ADL and work activities.   5. Pt to tolerate HEP to improve ROM and independence with ADL's  6. Increased R ankle Inv AROM to 20 deg for increased ease with ADL and work activities.    Long Term Goals: 8 weeks (progressing, not met)  1.Report decreased    R ankle    pain  <   / =  0  /10  to increase tolerance for ADLs  2. Increased R ankle DF AROM to 2-5 deg for increased ease with ADL and work activities.  3. Increased LE strength by 1 muscle grade  to increase tolerance for ADL and work activities.  4. Pt will ambulate 150 ft without AD independently.   5. Pt to tolerate HEP to improve ROM and independence with ADL's  6. Pt will perform SLS for at least 10 sec for increased ease ambulating on uneven surfaces.    Plan     Continue PT towards established goals.  Progress R ankle ROM, desensitization and scar mobs as tolerated.    Plan of care Certification: 4/27/2023 to 6/23/23.    Outpatient Physical Therapy 1 time weekly for 4 weeks, then 2 times per week for 4 weeks to include the following interventions: Electrical Stimulation  , Gait Training, Manual Therapy, Moist Heat/ Ice, Neuromuscular Re-ed, Patient Education, Self Care, Therapeutic Activities, Therapeutic Exercise, and dry needling.    Viry Waite, PT

## 2023-05-04 ENCOUNTER — CLINICAL SUPPORT (OUTPATIENT)
Dept: REHABILITATION | Facility: HOSPITAL | Age: 33
End: 2023-05-04
Payer: MEDICAID

## 2023-05-04 DIAGNOSIS — R26.89 DECREASED FUNCTIONAL MOBILITY: ICD-10-CM

## 2023-05-04 DIAGNOSIS — M25.571 CHRONIC PAIN OF RIGHT ANKLE: Primary | ICD-10-CM

## 2023-05-04 DIAGNOSIS — G89.29 CHRONIC PAIN OF RIGHT ANKLE: Primary | ICD-10-CM

## 2023-05-04 DIAGNOSIS — R26.9 GAIT ABNORMALITY: ICD-10-CM

## 2023-05-04 DIAGNOSIS — M25.60 DECREASED RANGE OF MOTION: ICD-10-CM

## 2023-05-04 PROCEDURE — 97110 THERAPEUTIC EXERCISES: CPT | Mod: PN

## 2023-05-04 PROCEDURE — 97010 HOT OR COLD PACKS THERAPY: CPT | Mod: PN

## 2023-05-08 DIAGNOSIS — S82.851A CLOSED DISPLACED TRIMALLEOLAR FRACTURE OF RIGHT ANKLE, INITIAL ENCOUNTER: Primary | ICD-10-CM

## 2023-05-24 NOTE — PROGRESS NOTES
[facility]   Physical Therapy Treatment Note      Name: Estrellita Garcia  Clinic Number: [id number]    Therapy Diagnosis:   Encounter Diagnoses   Name Primary?    Chronic pain of right ankle Yes    Decreased range of motion     Gait abnormality     Decreased functional mobility        Physician: Reinier Riddle MD    Visit Date: 5/25/2023    Physician Orders: PT Eval and Treat 1-2 times per week for 6 to 8 weeks per fracture protocol.  PT to start week of 04/23/2023.   25% WB in boot week 1; 50% WB in boot week 2; 75% WB in boot week 3; 100% WB in boot week 4.  Active/Active assist ankle ROM. May start slow, gradual strengthening once full WB in boot. Modalities as needed.  Stay in boot until next Ortho clinic visit.  Medical Diagnosis from Referral: Closed displaced trimalleolar fracture of right ankle, initial encounter  Evaluation Date: 4/27/2023  Authorization Period Expiration: 8/2/23  Plan of Care Expiration: 6/23/23  Visit # / Visits authorized: 2/ 8 +eval    PTA Visit #: 1/5     Time In: 10:55 (pt late)  Time Out: 11:38  Total Billable Time: 42 minutes     Precautions: Standard    Subjective     Pt reports: ankle still feels tight, stiff, and sore. Still having sensitivity along the ankle and has difficulty performing self-desensitization. Seeing MD later today for follow up.  She was compliant with home exercise program.  Response to previous treatment: did well  Functional change: able to put more weight into foot    Pain: 0/10  Location: right ankle     Objective      Objective Measures updated at progress report unless specified.     Treatment     Estrellita received the treatments listed below:      therapeutic exercises to develop strength, endurance, ROM, and flexibility for 32 minutes including:  Contract relax of R piriformis  Modified push/pull (L hip flex, R hip ext into mat with LE extended due to s/p R ankle ORIF)  Piriformis stretch 3x20 sec  R ankle AROM DF/PF, Inv/Sayda in  pain free ROM  R ankle ABCs   NP Gastroc stretch in long sit with strap 3x20 sec  NP Soleus stretch seated in chair (slide foot back) 3x20 sec  BAPS L2 (DF/PF, Inv/lloyd) x10 ea  Towel curls x15, 5 sec hold  Toe yoga x10    Gait training: pt educated on ambulation with 75% WB to R LE in boot with axillary crutches. Instruction on use of scale at home to determine amount of pressure into leg that equals 75% of weight.  Pt demonstrated understanding by end of session.      manual therapy techniques: for 10 minutes, including:  Desensitization techniques to medial R ankle scar with pillow case and then with towel  Scar mobilization to medial R ankle scar (CW/CCW, towards midline)  Pt educated on self desensitization and scar massage  Pt rec'd scar tape to medial R ankle scar    Estrellita received cold pack for 00 minutes to R ankle for decreased pain and edema.    Patient Education and Home Exercises       Education provided:   - importance of compliance with crutches and wearing boot for ambulation as ankle continues to heal per MD orders  -perform all ex in pain free ROM  Pt gave verbal understanding to all education provided     Written Home Exercises Provided: yes, updated HEP for hip strengthening. Exercises were reviewed and Estrellita was able to demonstrate them prior to the end of the session.  Estrellita demonstrated good  understanding of the education provided. See EMR under Patient Instructions for exercises provided during therapy sessions    Assessment     Pt presents with continued hypersensitivity along the R medial scar though overall scar tissue mobility is improving. Verbalizes understanding of importance to further self-desensitize at home. Stiffness to the R achilles tendon with end ranges ankle dorsiflexion, tightness reported with seated heel slides into closed chain dorsiflexion.  Demonstrates good understanding of need to maintain 75% weight bearing with ambulation for now. She will continue to benefit  from PT for improved R ankle ROM, gait training, and strength training for improved functional mobility.    Estrellita Is progressing well towards her goals.   Pt prognosis is Good.     Pt will continue to benefit from skilled outpatient physical therapy to address the deficits listed in the problem list box on initial evaluation, provide pt/family education and to maximize pt's level of independence in the home and community environment.     Pt's spiritual, cultural and educational needs considered and pt agreeable to plan of care and goals.     Anticipated barriers to physical therapy: pt has not been completely compliant with use of boot and crutches.    Goals:   Short Term Goals:  4 weeks (progressing, not met)  1.Report decreased    R ankle    pain  <   / =  2  /10  to increase tolerance for ADLs.  2. Increased R ankle DF AROM to 0 deg for increased ease with ADL and work activities.  3. Increased LE strength by 1/3 muscle grade  to increase tolerance for ADL and work activities.  4. Increased R ankle PF AROM to 60 deg for increased ease with ADL and work activities.   5. Pt to tolerate HEP to improve ROM and independence with ADL's  6. Increased R ankle Inv AROM to 20 deg for increased ease with ADL and work activities.    Long Term Goals: 8 weeks (progressing, not met)  1.Report decreased    R ankle    pain  <   / =  0  /10  to increase tolerance for ADLs  2. Increased R ankle DF AROM to 2-5 deg for increased ease with ADL and work activities.  3. Increased LE strength by 1 muscle grade  to increase tolerance for ADL and work activities.  4. Pt will ambulate 150 ft without AD independently.   5. Pt to tolerate HEP to improve ROM and independence with ADL's  6. Pt will perform SLS for at least 10 sec for increased ease ambulating on uneven surfaces.    Plan     Continue PT towards established goals.  Progress R ankle ROM, desensitization and scar mobs as tolerated.    Plan of care Certification: 4/27/2023 to  6/23/23.    Outpatient Physical Therapy 1 time weekly for 4 weeks, then 2 times per week for 4 weeks to include the following interventions: Electrical Stimulation  , Gait Training, Manual Therapy, Moist Heat/ Ice, Neuromuscular Re-ed, Patient Education, Self Care, Therapeutic Activities, Therapeutic Exercise, and dry needling.    Luz Hollins, PTA

## 2023-05-25 ENCOUNTER — OFFICE VISIT (OUTPATIENT)
Dept: ORTHOPEDICS | Facility: CLINIC | Age: 33
End: 2023-05-25
Payer: MEDICAID

## 2023-05-25 ENCOUNTER — HOSPITAL ENCOUNTER (OUTPATIENT)
Dept: RADIOLOGY | Facility: HOSPITAL | Age: 33
Discharge: HOME | End: 2023-05-25
Attending: ORTHOPAEDIC SURGERY
Payer: MEDICAID

## 2023-05-25 ENCOUNTER — CLINICAL SUPPORT (OUTPATIENT)
Dept: REHABILITATION | Facility: HOSPITAL | Age: 33
End: 2023-05-25
Payer: MEDICAID

## 2023-05-25 DIAGNOSIS — M25.571 CHRONIC PAIN OF RIGHT ANKLE: Primary | ICD-10-CM

## 2023-05-25 DIAGNOSIS — R26.9 GAIT ABNORMALITY: ICD-10-CM

## 2023-05-25 DIAGNOSIS — S82.851A CLOSED DISPLACED TRIMALLEOLAR FRACTURE OF RIGHT ANKLE, INITIAL ENCOUNTER: Primary | ICD-10-CM

## 2023-05-25 DIAGNOSIS — S82.851A CLOSED DISPLACED TRIMALLEOLAR FRACTURE OF RIGHT ANKLE, INITIAL ENCOUNTER: ICD-10-CM

## 2023-05-25 DIAGNOSIS — G89.29 CHRONIC PAIN OF RIGHT ANKLE: Primary | ICD-10-CM

## 2023-05-25 DIAGNOSIS — R26.89 DECREASED FUNCTIONAL MOBILITY: ICD-10-CM

## 2023-05-25 DIAGNOSIS — M25.60 DECREASED RANGE OF MOTION: ICD-10-CM

## 2023-05-25 PROCEDURE — 99999 PR PBB SHADOW E&M-EST. PATIENT-LVL II: ICD-10-PCS | Mod: PBBFAC,,, | Performed by: ORTHOPAEDIC SURGERY

## 2023-05-25 PROCEDURE — 73610 X-RAY EXAM OF ANKLE: CPT | Mod: 26,RT,, | Performed by: RADIOLOGY

## 2023-05-25 PROCEDURE — 99024 POSTOP FOLLOW-UP VISIT: CPT | Mod: ,,, | Performed by: ORTHOPAEDIC SURGERY

## 2023-05-25 PROCEDURE — 99024 PR POST-OP FOLLOW-UP VISIT: ICD-10-PCS | Mod: ,,, | Performed by: ORTHOPAEDIC SURGERY

## 2023-05-25 PROCEDURE — 1160F PR REVIEW ALL MEDS BY PRESCRIBER/CLIN PHARMACIST DOCUMENTED: ICD-10-PCS | Mod: CPTII,,, | Performed by: ORTHOPAEDIC SURGERY

## 2023-05-25 PROCEDURE — 73610 X-RAY EXAM OF ANKLE: CPT | Mod: TC,PO,RT

## 2023-05-25 PROCEDURE — 73610 XR ANKLE COMPLETE 3 VIEW RIGHT: ICD-10-PCS | Mod: 26,RT,, | Performed by: RADIOLOGY

## 2023-05-25 PROCEDURE — 97110 THERAPEUTIC EXERCISES: CPT | Mod: PN,CQ

## 2023-05-25 PROCEDURE — 1159F MED LIST DOCD IN RCRD: CPT | Mod: CPTII,,, | Performed by: ORTHOPAEDIC SURGERY

## 2023-05-25 PROCEDURE — 1159F PR MEDICATION LIST DOCUMENTED IN MEDICAL RECORD: ICD-10-PCS | Mod: CPTII,,, | Performed by: ORTHOPAEDIC SURGERY

## 2023-05-25 PROCEDURE — 99999 PR PBB SHADOW E&M-EST. PATIENT-LVL II: CPT | Mod: PBBFAC,,, | Performed by: ORTHOPAEDIC SURGERY

## 2023-05-25 PROCEDURE — 99212 OFFICE O/P EST SF 10 MIN: CPT | Mod: PBBFAC,PN | Performed by: ORTHOPAEDIC SURGERY

## 2023-05-25 PROCEDURE — 1160F RVW MEDS BY RX/DR IN RCRD: CPT | Mod: CPTII,,, | Performed by: ORTHOPAEDIC SURGERY

## 2023-05-25 NOTE — PROGRESS NOTES
Status/Diagnosis: Right trimalleolar ankle fracture dislocation  Date of Surgery: 03/01/2023  Date of Injury: 02/19/2023  Return visit: 6 weeks  X-rays on Return: WB 3-views Right ankle      Present History:  Estrellita Garcia is a 32 y.o. female who returns for routine postoperative visit.    Patient has been working with physical therapy.  Currently started partial, 75%, weight-bearing in the tall boot earlier today.  Presents today using a knee scooter to get around clinic. Taking aspirin for DVT prophylaxis.  Only complaint today in regard to posterolateral ankle pain/popping suggestive of peroneal subluxation while working with physical therapy.  Vague history of heart arrhythmia, on atenolol.  Managed by Dr. Holcomb.  Vapes regularly with nicotine.  Works from home.      Past Medical History:   Diagnosis Date    Hypertension     Palpitations        Past Surgical History:   Procedure Laterality Date    APPLICATION OF SPLINT Right 2/23/2023    Procedure: APPLICATION, SPLINT;  Surgeon: Reinier Riddle MD;  Location: [facility] OR;  Service: Orthopedics;  Laterality: Right;    CLOSED REDUCTION Right 2/23/2023    Procedure: CLOSED REDUCTION;  Surgeon: Reinier Riddle MD;  Location: [facility] OR;  Service: Orthopedics;  Laterality: Right;  placement of short leg splint     FIXATION OF SYNDESMOSIS OF ANKLE Right 3/1/2023    Procedure: FIXATION, SYNDESMOSIS, ANKLE;  Surgeon: Reinier Riddle MD;  Location: [facility] OR;  Service: Orthopedics;  Laterality: Right;    OPEN REDUCTION AND INTERNAL FIXATION (ORIF) OF INJURY OF ANKLE Right 3/1/2023    Procedure: ORIF, ANKLE;  Surgeon: Reinier Riddle MD;  Location: [facility] OR;  Service: Orthopedics;  Laterality: Right;    uterine polyps  2014       Current Outpatient Medications   Medication Sig    acetaminophen (TYLENOL) 325 MG tablet Take 325 mg by mouth every 6 (six) hours as needed for Pain.    ALPRAZolam (XANAX) 0.25 MG tablet Take 0.25 mg by mouth daily as needed.    aspirin  (ECOTRIN) 81 MG EC tablet Take 1 tablet (81 mg total) by mouth once daily.    atenoloL (TENORMIN) 25 MG tablet Take 1 tablet (25 mg total) by mouth once daily. (Patient taking differently: Take 25 mg by mouth every evening.)    dextroamphetamine-amphetamine (ADDERALL) 15 mg tablet Take 15 mg by mouth 2 (two) times daily.    EScitalopram oxalate (LEXAPRO) 20 MG tablet Take 20 mg by mouth every evening. Takes 30mg    ondansetron (ZOFRAN) 8 MG tablet TAKE 1 TABLET(8 MG) BY MOUTH EVERY 12 HOURS AS NEEDED FOR NAUSEA     No current facility-administered medications for this visit.       Review of patient's allergies indicates:  No Known Allergies    Family History   Problem Relation Age of Onset    No Known Problems Mother     No Known Problems Father        Social History     Socioeconomic History    Marital status: Single   Tobacco Use    Smoking status: Some Days     Types: Vaping with nicotine    Smokeless tobacco: Never   Substance and Sexual Activity    Alcohol use: Yes     Comment: socially    Drug use: Never    Sexual activity: Yes       Physical exam:  There were no vitals filed for this visit.  There is no height or weight on file to calculate BMI.  General: In no apparent distress; well developed and well nourished.  HEENT: normocephalic; atraumatic.  Cardiovascular: regular rate.  Respiratory: no increased work of breathing.  Musculoskeletal:   Gait: unable to assess  Inspection:  Medial and lateral based surgical incisions well healed.  There is some degree of hypertrophic scar formation however no signs or symptoms of infection.  Mild-to-moderate residual posterolateral ankle swelling over the course of the peroneal tendons with moderate tenderness on deep palpation.  5/5 strength with resisted foot eversion.  Unable to recreate peroneal subluxation with provocative testing.  Improved ankle range of motion from 5° dorsiflexion to 60° plantar flexion.  Gross motor and sensory function intact distally.  Brisk  cap refill all 5 digits.                   Imaging Studies/Outside documentation:  WB 3-views Right ankle:   Trimalleolar ankle fracture status post medial and lateral malleolar fracture ORIF and syndesmotic reduction with tight rope.  Overall alignment well maintained.  Fractures appear to be well healed.  Congruent ankle mortise.  No evidence of implant loosening or failure.      Assessment:  Estrellita Garcia is a 32 y.o. female with Right trimalleolar ankle fracture dislocation.     Plan:   Clinical and radiographic findings were discussed.  Patient will continue physical therapy per protocol until full weight-bearing in the boot.  We will go ahead and provide an ASO lace-up ankle brace such that she may transition to this after being full weight-bearing in the boot x1 week.      We did discuss what sounds like subjective peroneal tendon subluxation.  We will continue to monitor this closely.  No evidence of this on provocative testing today.  Tylenol as needed for pain.  Discussed scar massage for hypertrophic scar tissue formation.  Return to clinic in 6 weeks for repeat evaluation and x-ray.  Patient voiced understanding.  All questions were answered.      This note was created using voice recognition software and may contain grammatical errors.

## 2023-05-31 NOTE — PROGRESS NOTES
[facility]   Physical Therapy Treatment Note      Name: Estrellita Garcia  [facility] Number: [id number]    Therapy Diagnosis:   Encounter Diagnoses   Name Primary?    Chronic pain of right ankle Yes    Decreased range of motion     Gait abnormality     Decreased functional mobility        Physician: Reinier Riddle MD    Visit Date: 6/1/2023    Physician Orders: PT Eval and Treat 1-2 times per week for 6 to 8 weeks per fracture protocol.  PT to start week of 04/23/2023.   25% WB in boot week 1; 50% WB in boot week 2; 75% WB in boot week 3; 100% WB in boot week 4.  Active/Active assist ankle ROM. May start slow, gradual strengthening once full WB in boot. Modalities as needed.  Stay in boot until next Ortho clinic visit.  Medical Diagnosis from Referral: Closed displaced trimalleolar fracture of right ankle, initial encounter  Evaluation Date: 4/27/2023  Authorization Period Expiration: 8/2/23  Plan of Care Expiration: 6/23/23  Visit # / Visits authorized: 3/ 8 +eval    PTA Visit #: 1/5     Time In: 2:30  Time Out: 3:10  Total Billable Time: 40 minutes     Precautions: Standard    Subjective     Pt reports: her R ankle is feeling better.  She is able to walk with full WBing in boot with use of SC.  She states she is still doing desensitization techniques to B incisions at home, but still some hypersensitivity.  She was compliant with home exercise program.  Response to previous treatment: did well  Functional change: able to put more weight into foot    Pain: 0/10, 2-3/10 at its worst after walking  Location: right ankle     Objective      Observation: pt is pleasant and cooperative, R boot    Pt ambulated into clinic with B axillary crutches and R boot, NWB R LE mod I    Ankle Range of Motion: AROM  Ankle Left Right   Dorsiflexion 4 (0 knee flex) -2 tight (0 tight gastroc)   Plantarflexion 73  57 pain   Inversion 32  21   Eversion 16  10     Strength:    Lower Extremity Strength  Right LE  Left  LE    Knee extension: 5/5 Knee extension: 5/5   Knee flexion: 4+/5 Knee flexion: 4+/5   Hip flexion: 4/5 Hip flexion: 5/5   Hip extension:  3/5 Hip extension: 4+/5   Hip abduction: 4+/5 Hip abduction: 4+/5   Hip adduction: 5/5 Hip adduction 4+/5        MMT: R ankle not tested due to MD protocol   Ankle Left   Dorsiflexion 5/5   Plantarflexion 5/5   Inversion 5/5   Eversion 5/5     R ankle incision is closed and healed, no signs of infection    Sensation: grossly intact to light touch     Treatment     Estrellita received the treatments listed below:      therapeutic exercises to develop strength, endurance, ROM, and flexibility for 30 minutes including:  Reassessment  BAPS L3 (DF/PF, sayda) x10 ea--did not perform inv due to peroneal popping  Towel curls x15, 5 sec hold  Toe yoga a60--knhc to avoid supination to avoid peroneal popping    Not performed due to time:  Contract relax of R piriformis  Modified push/pull (L hip flex, R hip ext into mat with LE extended due to s/p R ankle ORIF)  Piriformis stretch 3x20 sec  R ankle AROM DF/PF, Sayda in pain free ROM  R ankle ABCs   NP Gastroc stretch in long sit with strap 3x20 sec  NP Soleus stretch seated in chair (slide foot back) 3x20 sec    Gait training: pt educated on ambulation with 100% WB to R LE in boot with SC on L.  Pt ambulated 150ft with boot and SC on L mod I, then 150 with boot and no AD mod I.  Pt presents with slow pace and pelvic rocking due to boot.        manual therapy techniques: for 10 minutes, including:  Desensitization techniques to medial R ankle scar with pillow case and then with towel  Scar mobilization to medial R ankle scar (CW/CCW, towards midline)  Pt educated on self desensitization and scar massage  Pt rec'd scar tape to medial R ankle scar    Estrellita received cold pack for 00 minutes to R ankle for decreased pain and edema.    Patient Education and Home Exercises       Education provided:   - bring ankle brace to appt next Thursday to wean out  of boot  -perform all ex in pain free ROM  Pt gave verbal understanding to all education provided     Written Home Exercises Provided: pt instructed to continue HEP. Exercises were reviewed and Estrellita was able to demonstrate them prior to the end of the session.  Estrellita demonstrated good  understanding of the education provided. See EMR under Patient Instructions for exercises provided during therapy sessions    Assessment     Pt reassessed today.  She has improved with increased R ankle AROM and LE strength.  She continues to have glute weakness.  She was able to ambulate with 100% weight bearing in boot today without pain.  She will continue to benefit from PT for improved R ankle ROM, gait training, and strength training for improved functional mobility.    Estrellita Is progressing well towards her goals.   Pt prognosis is Good.     Pt will continue to benefit from skilled outpatient physical therapy to address the deficits listed in the problem list box on initial evaluation, provide pt/family education and to maximize pt's level of independence in the home and community environment.     Pt's spiritual, cultural and educational needs considered and pt agreeable to plan of care and goals.     Anticipated barriers to physical therapy: pt has not been completely compliant with use of boot and crutches.    Goals:   Short Term Goals:  4 weeks   1.Report decreased    R ankle    pain  <   / =  2  /10  to increase tolerance for ADLs. (Met)  2. Increased R ankle DF AROM to 0 deg for increased ease with ADL and work activities. (Met with knee flex only)  3. Increased LE strength by 1/3 muscle grade  to increase tolerance for ADL and work activities. (Met)  4. Increased R ankle PF AROM to 60 deg for increased ease with ADL and work activities. (Progressing, 57 deg)   5. Pt to tolerate HEP to improve ROM and independence with ADL's (met)  6. Increased R ankle Inv AROM to 20 deg for increased ease with ADL and work activities.  (Met)    Long Term Goals: 8 weeks (progressing, not met)  1.Report decreased    R ankle    pain  <   / =  0  /10  to increase tolerance for ADLs  2. Increased R ankle DF AROM to 2-5 deg for increased ease with ADL and work activities.  3. Increased LE strength by 1 muscle grade  to increase tolerance for ADL and work activities.  4. Pt will ambulate 150 ft without AD independently.   5. Pt to tolerate HEP to improve ROM and independence with ADL's  6. Pt will perform SLS for at least 10 sec for increased ease ambulating on uneven surfaces.    Unmet goals remain appropriate within 4 weeks.    Plan     Continue PT towards established goals.  Progress R ankle ROM, desensitization and scar mobs as tolerated.    Plan of care Certification: 4/27/2023 to 6/23/23.    Outpatient Physical Therapy 1 time weekly for 4 weeks, then 2 times per week for 4 weeks to include the following interventions: Electrical Stimulation  , Gait Training, Manual Therapy, Moist Heat/ Ice, Neuromuscular Re-ed, Patient Education, Self Care, Therapeutic Activities, Therapeutic Exercise, and dry needling.    Viry Waite, PT

## 2023-06-01 ENCOUNTER — CLINICAL SUPPORT (OUTPATIENT)
Dept: REHABILITATION | Facility: HOSPITAL | Age: 33
End: 2023-06-01
Payer: MEDICAID

## 2023-06-01 DIAGNOSIS — R26.89 DECREASED FUNCTIONAL MOBILITY: ICD-10-CM

## 2023-06-01 DIAGNOSIS — G89.29 CHRONIC PAIN OF RIGHT ANKLE: Primary | ICD-10-CM

## 2023-06-01 DIAGNOSIS — R26.9 GAIT ABNORMALITY: ICD-10-CM

## 2023-06-01 DIAGNOSIS — M25.571 CHRONIC PAIN OF RIGHT ANKLE: Primary | ICD-10-CM

## 2023-06-01 DIAGNOSIS — M25.60 DECREASED RANGE OF MOTION: ICD-10-CM

## 2023-06-01 PROCEDURE — 97110 THERAPEUTIC EXERCISES: CPT | Mod: PN

## 2023-06-06 ENCOUNTER — CLINICAL SUPPORT (OUTPATIENT)
Dept: REHABILITATION | Facility: HOSPITAL | Age: 33
End: 2023-06-06
Payer: MEDICAID

## 2023-06-06 ENCOUNTER — TELEPHONE (OUTPATIENT)
Dept: ORTHOPEDICS | Facility: CLINIC | Age: 33
End: 2023-06-06
Payer: MEDICAID

## 2023-06-06 DIAGNOSIS — G89.29 CHRONIC PAIN OF RIGHT ANKLE: Primary | ICD-10-CM

## 2023-06-06 DIAGNOSIS — R26.89 DECREASED FUNCTIONAL MOBILITY: ICD-10-CM

## 2023-06-06 DIAGNOSIS — M25.571 CHRONIC PAIN OF RIGHT ANKLE: Primary | ICD-10-CM

## 2023-06-06 DIAGNOSIS — M25.60 DECREASED RANGE OF MOTION: ICD-10-CM

## 2023-06-06 DIAGNOSIS — R26.9 GAIT ABNORMALITY: ICD-10-CM

## 2023-06-06 PROCEDURE — 97110 THERAPEUTIC EXERCISES: CPT | Mod: PN

## 2023-06-06 NOTE — PROGRESS NOTES
[facility]   Physical Therapy Treatment Note      Name: Estrellita Garcia  [facility] Number: [id number]    Therapy Diagnosis:   Encounter Diagnoses   Name Primary?    Chronic pain of right ankle Yes    Decreased range of motion     Gait abnormality     Decreased functional mobility          Physician: Reinier Riddle MD    Visit Date: 6/6/2023    Physician Orders: PT Eval and Treat 1-2 times per week for 6 to 8 weeks per fracture protocol.  PT to start week of 04/23/2023.   25% WB in boot week 1; 50% WB in boot week 2; 75% WB in boot week 3; 100% WB in boot week 4.  Active/Active assist ankle ROM. May start slow, gradual strengthening once full WB in boot. Modalities as needed.  Stay in boot until next Ortho clinic visit.  Medical Diagnosis from Referral: Closed displaced trimalleolar fracture of right ankle, initial encounter  Evaluation Date: 4/27/2023  Authorization Period Expiration: 8/2/23  Plan of Care Expiration: 6/23/23  Visit # / Visits authorized: 4/ 8 +eval    PTA Visit #: 1/5     Time In: 1:05  Time Out: 1:55  Total Billable Time: 40 minutes     Precautions: Standard    Subjective     Pt reports: she's had increased pain to R ankle since starting 100% WBing in the boot.  She states she was busy this weekend.  She states she still gets popping to lateral R ankle occasionally while walking in boot or donning her slipper.  She was compliant with home exercise program.  Response to previous treatment: did well  Functional change: able to put more weight into foot    Pain: 1/10 presently and 4-5/10 over the weekend  Location: right ankle     Objective      Min edema to lateral R ankle    CMS Impairment/Limitation/Restriction for FOTO Ankle Survey  Status Limitation G-Code CMS Severity Modifier  Intake 26% 74%  Predicted 57% 43% Goal Status+ CK - At least 40 percent but less than 60 percent  6/6/2023 32% 68% Current Status CL - At least 60 percent but less than 80 percent    Treatment      Estrellita received the treatments listed below:      therapeutic exercises to develop strength, endurance, ROM, and flexibility for 30 minutes including:  BAPS L3 (DF/PF, lloyd) x15 ea--did not perform inv due to peroneal popping  Towel curls x15, 5 sec hold  Toe yoga f24--zjmp to avoid supination to avoid peroneal popping  NP Contract relax of R piriformis  Modified push/pull (L hip flex, R hip ext into mat)  Piriformis stretch 3x20 sec  Bridges 2x10, 3 sec hold  Gastroc stretch in long sit with strap 3x20 sec  Soleus stretch seated in chair (slide foot back) 3x20 sec    manual therapy techniques: for 10 minutes, including:  Desensitization techniques to medial R ankle scar with pillow case and then with towel  Scar mobilization to medial R ankle scar (CW/CCW, towards midline)  Pt educated on self desensitization and scar massage    Estrellita received cold pack for 10 minutes to R ankle for decreased pain and edema.    Patient Education and Home Exercises       Education provided:   - importance of desensitization and scar massage daily  -follow up with MD on Friday  Pt gave verbal understanding to all education provided     Written Home Exercises Provided: pt instructed to continue HEP. Exercises were reviewed and Estrellita was able to demonstrate them prior to the end of the session.  Estrellita demonstrated good  understanding of the education provided. See EMR under Patient Instructions for exercises provided during therapy sessions    Assessment     Pt was able to ambulate into clinic without R ankle pain.  She was able to tolerate weight bearing with bridges without pain.  She reported popping to lateral ankle with inversion on BAPS board so stopped after 1 repetition.   She will follow up with MD on Friday.  She will continue to benefit from PT for improved R ankle ROM, gait training, and strength training for improved functional mobility.    Estrellita Is progressing well towards her goals.   Pt prognosis is Good.     Pt  will continue to benefit from skilled outpatient physical therapy to address the deficits listed in the problem list box on initial evaluation, provide pt/family education and to maximize pt's level of independence in the home and community environment.     Pt's spiritual, cultural and educational needs considered and pt agreeable to plan of care and goals.     Anticipated barriers to physical therapy: pt has not been completely compliant with use of boot and crutches.    Goals:   Short Term Goals:  4 weeks   1.Report decreased    R ankle    pain  <   / =  2  /10  to increase tolerance for ADLs. (Met)  2. Increased R ankle DF AROM to 0 deg for increased ease with ADL and work activities. (Met with knee flex only)  3. Increased LE strength by 1/3 muscle grade  to increase tolerance for ADL and work activities. (Met)  4. Increased R ankle PF AROM to 60 deg for increased ease with ADL and work activities. (Progressing, 57 deg)   5. Pt to tolerate HEP to improve ROM and independence with ADL's (met)  6. Increased R ankle Inv AROM to 20 deg for increased ease with ADL and work activities. (Met)    Long Term Goals: 8 weeks (progressing, not met)  1.Report decreased    R ankle    pain  <   / =  0  /10  to increase tolerance for ADLs  2. Increased R ankle DF AROM to 2-5 deg for increased ease with ADL and work activities.  3. Increased LE strength by 1 muscle grade  to increase tolerance for ADL and work activities.  4. Pt will ambulate 150 ft without AD independently.   5. Pt to tolerate HEP to improve ROM and independence with ADL's  6. Pt will perform SLS for at least 10 sec for increased ease ambulating on uneven surfaces.    Unmet goals remain appropriate within 4 weeks.    Plan     Hold PT until pt follows up with MD on Friday per MD recommendation in staff note today due to pt still reporting popping to lateral ankle.    Plan of care Certification: 4/27/2023 to 6/23/23.    Outpatient Physical Therapy 1 time weekly  for 4 weeks, then 2 times per week for 4 weeks to include the following interventions: Electrical Stimulation  , Gait Training, Manual Therapy, Moist Heat/ Ice, Neuromuscular Re-ed, Patient Education, Self Care, Therapeutic Activities, Therapeutic Exercise, and dry needling.    Viry Waite, PT

## 2023-06-06 NOTE — TELEPHONE ENCOUNTER
----- Message from Reinier Riddle MD sent at 6/6/2023  1:37 PM CDT -----  Regarding: RE: Pt reporting popping to lateral R ankle  Will try to get her in to my clinic later this week to re-evaluate. Would hold off on PT in the meantime and patient go back to boot.  ----- Message -----  From: Viry Waite PT  Sent: 6/6/2023   1:35 PM CDT  To: Reinier Riddle MD, [facility] Staff  Subject: Pt reporting popping to lateral R ankle          Patient reports popping to lateral R ankle/peroneals occasionally with walking.  She had some popping to R peroneals with ankle inversion on BAPS so exercise stopped.  She does present with edema to lateral R ankle today.    You are not scheduled to follow up with her until 7/6/23.    Thanks,  Viry Waite DPT

## 2023-06-08 DIAGNOSIS — S82.851A CLOSED DISPLACED TRIMALLEOLAR FRACTURE OF RIGHT ANKLE, INITIAL ENCOUNTER: Primary | ICD-10-CM

## 2023-06-09 ENCOUNTER — HOSPITAL ENCOUNTER (OUTPATIENT)
Dept: RADIOLOGY | Facility: HOSPITAL | Age: 33
Discharge: HOME | End: 2023-06-09
Attending: ORTHOPAEDIC SURGERY
Payer: MEDICAID

## 2023-06-09 ENCOUNTER — OFFICE VISIT (OUTPATIENT)
Dept: ORTHOPEDICS | Facility: CLINIC | Age: 33
End: 2023-06-09
Payer: MEDICAID

## 2023-06-09 ENCOUNTER — TELEPHONE (OUTPATIENT)
Dept: ORTHOPEDICS | Facility: CLINIC | Age: 33
End: 2023-06-09
Payer: MEDICAID

## 2023-06-09 DIAGNOSIS — S82.851A CLOSED DISPLACED TRIMALLEOLAR FRACTURE OF RIGHT ANKLE, INITIAL ENCOUNTER: Primary | ICD-10-CM

## 2023-06-09 DIAGNOSIS — S82.851A CLOSED DISPLACED TRIMALLEOLAR FRACTURE OF RIGHT ANKLE, INITIAL ENCOUNTER: ICD-10-CM

## 2023-06-09 DIAGNOSIS — S93.331A SUBLUXATION OF PERONEAL TENDON OF RIGHT FOOT: ICD-10-CM

## 2023-06-09 DIAGNOSIS — S93.04XA ANKLE DISLOCATION, RIGHT, INITIAL ENCOUNTER: ICD-10-CM

## 2023-06-09 DIAGNOSIS — S93.331A SUBLUXATION OF PERONEAL TENDON OF RIGHT FOOT: Primary | ICD-10-CM

## 2023-06-09 DIAGNOSIS — M25.571 RIGHT ANKLE PAIN: ICD-10-CM

## 2023-06-09 PROCEDURE — 1159F PR MEDICATION LIST DOCUMENTED IN MEDICAL RECORD: ICD-10-PCS | Mod: CPTII,,, | Performed by: ORTHOPAEDIC SURGERY

## 2023-06-09 PROCEDURE — 99212 OFFICE O/P EST SF 10 MIN: CPT | Mod: PBBFAC,PN | Performed by: ORTHOPAEDIC SURGERY

## 2023-06-09 PROCEDURE — 73610 XR ANKLE COMPLETE 3 VIEW RIGHT: ICD-10-PCS | Mod: 26,RT,, | Performed by: RADIOLOGY

## 2023-06-09 PROCEDURE — 1159F MED LIST DOCD IN RCRD: CPT | Mod: CPTII,,, | Performed by: ORTHOPAEDIC SURGERY

## 2023-06-09 PROCEDURE — 99999 PR PBB SHADOW E&M-EST. PATIENT-LVL II: CPT | Mod: PBBFAC,,, | Performed by: ORTHOPAEDIC SURGERY

## 2023-06-09 PROCEDURE — 1160F PR REVIEW ALL MEDS BY PRESCRIBER/CLIN PHARMACIST DOCUMENTED: ICD-10-PCS | Mod: CPTII,,, | Performed by: ORTHOPAEDIC SURGERY

## 2023-06-09 PROCEDURE — 73610 X-RAY EXAM OF ANKLE: CPT | Mod: TC,PO,RT

## 2023-06-09 PROCEDURE — 99213 OFFICE O/P EST LOW 20 MIN: CPT | Mod: S$PBB,,, | Performed by: ORTHOPAEDIC SURGERY

## 2023-06-09 PROCEDURE — 1160F RVW MEDS BY RX/DR IN RCRD: CPT | Mod: CPTII,,, | Performed by: ORTHOPAEDIC SURGERY

## 2023-06-09 PROCEDURE — 73610 X-RAY EXAM OF ANKLE: CPT | Mod: 26,RT,, | Performed by: RADIOLOGY

## 2023-06-09 PROCEDURE — 99213 PR OFFICE/OUTPT VISIT, EST, LEVL III, 20-29 MIN: ICD-10-PCS | Mod: S$PBB,,, | Performed by: ORTHOPAEDIC SURGERY

## 2023-06-09 PROCEDURE — 99999 PR PBB SHADOW E&M-EST. PATIENT-LVL II: ICD-10-PCS | Mod: PBBFAC,,, | Performed by: ORTHOPAEDIC SURGERY

## 2023-06-09 NOTE — TELEPHONE ENCOUNTER
Contacted patient. Patient stated she called [facility]. Phone [phone] was given. She would like the US order fax to them as well since its closer to her home. Informed her Dr. Riddle was in sx. Dr. Riddle staff will contact her Monday to see if a new order can be placed since he wanted a specific provider to do the US. Patient verbalized understanding.

## 2023-06-09 NOTE — PROGRESS NOTES
Status/Diagnosis: Right trimalleolar ankle fracture dislocation.  Right peroneal tendon subluxation.  Date of Surgery: 03/01/2023  Date of Injury: 02/19/2023  Return visit: Will call after Dynamic U/S to assess peroneals  X-rays on Return: WB 3-views Right ankle      Present History:  Estrellita Garcia is a 32 y.o. female who returns for routine postoperative visit.    Patient presents prior to her regularly scheduled visit after last being seen on 05/25/2023.  Endorses worsening posterolateral ankle pain with popping sensation.  This has become more prominent while working with physical therapy.  Specifically notes when doing any form of circumduction type maneuver.  Denies any new injuries.  She has been beginning to transition out of the boot however upon noting the above, she went back to the boot, currently weight-bearing as tolerated.  Vague history of heart arrhythmia, on atenolol.  Managed by Dr. Holcomb.  Vapes regularly with nicotine.  Works from home.      Past Medical History:   Diagnosis Date    Hypertension     Palpitations        Past Surgical History:   Procedure Laterality Date    APPLICATION OF SPLINT Right 2/23/2023    Procedure: APPLICATION, SPLINT;  Surgeon: Reinier Riddle MD;  Location: [facility] OR;  Service: Orthopedics;  Laterality: Right;    CLOSED REDUCTION Right 2/23/2023    Procedure: CLOSED REDUCTION;  Surgeon: Reinier Riddle MD;  Location: [facility] OR;  Service: Orthopedics;  Laterality: Right;  placement of short leg splint     FIXATION OF SYNDESMOSIS OF ANKLE Right 3/1/2023    Procedure: FIXATION, SYNDESMOSIS, ANKLE;  Surgeon: Reinier Riddle MD;  Location: [facility] OR;  Service: Orthopedics;  Laterality: Right;    OPEN REDUCTION AND INTERNAL FIXATION (ORIF) OF INJURY OF ANKLE Right 3/1/2023    Procedure: ORIF, ANKLE;  Surgeon: Reinier Riddle MD;  Location: [facility] OR;  Service: Orthopedics;  Laterality: Right;    uterine polyps  2014       Current Outpatient Medications   Medication Sig     acetaminophen (TYLENOL) 325 MG tablet Take 325 mg by mouth every 6 (six) hours as needed for Pain.    ALPRAZolam (XANAX) 0.25 MG tablet Take 0.25 mg by mouth daily as needed.    aspirin (ECOTRIN) 81 MG EC tablet Take 1 tablet (81 mg total) by mouth once daily.    atenoloL (TENORMIN) 25 MG tablet Take 1 tablet (25 mg total) by mouth once daily. (Patient taking differently: Take 25 mg by mouth every evening.)    dextroamphetamine-amphetamine (ADDERALL) 15 mg tablet Take 15 mg by mouth 2 (two) times daily.    EScitalopram oxalate (LEXAPRO) 20 MG tablet Take 20 mg by mouth every evening. Takes 30mg    ondansetron (ZOFRAN) 8 MG tablet TAKE 1 TABLET(8 MG) BY MOUTH EVERY 12 HOURS AS NEEDED FOR NAUSEA     No current facility-administered medications for this visit.       Review of patient's allergies indicates:  No Known Allergies    Family History   Problem Relation Age of Onset    No Known Problems Mother     No Known Problems Father        Social History     Socioeconomic History    Marital status: Single   Tobacco Use    Smoking status: Some Days     Types: Vaping with nicotine    Smokeless tobacco: Never   Substance and Sexual Activity    Alcohol use: Yes     Comment: socially    Drug use: Never    Sexual activity: Yes       Physical exam:  There were no vitals filed for this visit.  There is no height or weight on file to calculate BMI.  General: In no apparent distress; well developed and well nourished.  HEENT: normocephalic; atraumatic.  Cardiovascular: regular rate.  Respiratory: no increased work of breathing.  Musculoskeletal:   Gait: nonantalgic  Inspection:  Medial and lateral based surgical incisions well healed.  Still with some degree of hypertrophic scar formation however no signs or symptoms of infection.    Still with mild residual posterolateral ankle swelling and pain localized to the superior peroneal retinaculum.  Tenderness present at this site.  5/5 strength with resisted foot eversion.   Inconsistent however patient does have what appears to be peroneal tendon subluxation with provocative testing.  Ankle range of motion from 5° dorsiflexion to 60° plantar flexion.  Gross motor and sensory function intact distally.  Brisk cap refill all 5 digits.                   Imaging Studies/Outside documentation:  WB 3-views Right ankle:   Trimalleolar ankle fracture status post medial and lateral malleolar fracture ORIF and syndesmotic reduction with tight rope.  Overall alignment well maintained.  Fractures appear to be well healed.  Congruent ankle mortise.  No evidence of implant loosening or failure.      Assessment:  Estrellita aGrcia is a 32 y.o. female with Right trimalleolar ankle fracture dislocation.     Plan:   Clinical and radiographic findings were discussed.  Significant healing is present at the previously noted fracture sites.  As noted above, concern for worsening right peroneal tendon subluxation.    Recommend further testing with dynamic ultrasound.  We will have this set up over the next week after which point I will contact the patient to discuss the treatment plan going forward.  Patient to remain in the boot until told otherwise.  May weight bear as tolerated.  Hold off on physical therapy.  Pending dynamic ultrasound results, patient may require CT to assess fracture healing status further.  Patient voiced understanding.  All questions were answered.      This note was created using voice recognition software and may contain grammatical errors.

## 2023-06-09 NOTE — TELEPHONE ENCOUNTER
----- Message from Ruth Yanez MA sent at 6/9/2023  1:54 PM CDT -----  Contact: [facility]   No number given   Wants order for US to     Call back  [phone]

## 2023-06-13 ENCOUNTER — PATIENT MESSAGE (OUTPATIENT)
Dept: ORTHOPEDICS | Facility: CLINIC | Age: 33
End: 2023-06-13
Payer: MEDICAID

## 2023-06-13 ENCOUNTER — TELEPHONE (OUTPATIENT)
Dept: ORTHOPEDICS | Facility: CLINIC | Age: 33
End: 2023-06-13
Payer: MEDICAID

## 2023-06-13 NOTE — TELEPHONE ENCOUNTER
Spoke with Jose Francisco Stewart DPT who performed dynamic ultrasound of right peroneal tendons earlier today.  Based on this conversation, no evidence of peroneal tendon subluxation.  Superior peroneal retinaculum appears intact.  Fibular groove is adequately deep.  Per his reports, difficult to elicit popping however when present appears to be myofascial.    I discussed the above findings with the patient via telephone today.  Based on the above, no surgical intervention is indicated.  Recommend continued physical therapy.  We will place referral to be seen by Mr. Stewart going forward for soft tissue mobilization, modalities as needed, etc..    Patient voiced understanding.  All questions were answered.  Return to clinic in 6-8 weeks for repeat evaluation.

## 2023-06-14 ENCOUNTER — PATIENT MESSAGE (OUTPATIENT)
Dept: ORTHOPEDICS | Facility: CLINIC | Age: 33
End: 2023-06-14
Payer: MEDICAID

## 2023-06-22 ENCOUNTER — DOCUMENTATION ONLY (OUTPATIENT)
Dept: REHABILITATION | Facility: HOSPITAL | Age: 33
End: 2023-06-22
Payer: MEDICAID

## 2023-06-22 NOTE — PROGRESS NOTES
[facility]  Physical Therapy Discharge Note    Name: Estrellita Garcia  [facility] Number: [id number]    Therapy Diagnosis:        Encounter Diagnoses   Name Primary?    Chronic pain of right ankle Yes    Decreased range of motion      Gait abnormality      Decreased functional mobility              Physician: Reinier Riddle MD     Visit Date: 6/6/2023     Physician Orders: PT Eval and Treat 1-2 times per week for 6 to 8 weeks per fracture protocol.  PT to start week of 04/23/2023.   25% WB in boot week 1; 50% WB in boot week 2; 75% WB in boot week 3; 100% WB in boot week 4.  Active/Active assist ankle ROM. May start slow, gradual strengthening once full WB in boot. Modalities as needed.  Stay in boot until next Ortho clinic visit.  Medical Diagnosis from Referral: Closed displaced trimalleolar fracture of right ankle, initial encounter  Evaluation Date: 4/27/2023      Date of Last visit: 6/6/23  Total Visits Received: 5    ASSESSMENT      Pt had ultrasound: no evidence of peroneal tendon subluxation.  Superior peroneal retinaculum appears intact.  Fibular groove is adequately deep.  pt will continue PT in [place] with Jose Francisco Stewart DPT.    Discharge reason: Other:  pt attending another PT facility    Discharge FOTO Score: not performed due to did not finish PT    Goals:   Short Term Goals:  4 weeks   1.Report decreased    R ankle    pain  <   / =  2  /10  to increase tolerance for ADLs. (Met)  2. Increased R ankle DF AROM to 0 deg for increased ease with ADL and work activities. (Met with knee flex only)  3. Increased LE strength by 1/3 muscle grade  to increase tolerance for ADL and work activities. (Met)  4. Increased R ankle PF AROM to 60 deg for increased ease with ADL and work activities. (Progressing, 57 deg)   5. Pt to tolerate HEP to improve ROM and independence with ADL's (met)  6. Increased R ankle Inv AROM to 20 deg for increased ease with ADL and work activities. (Met)     Long  Term Goals: 8 weeks (progressing, not met)  1.Report decreased    R ankle    pain  <   / =  0  /10  to increase tolerance for ADLs  2. Increased R ankle DF AROM to 2-5 deg for increased ease with ADL and work activities.  3. Increased LE strength by 1 muscle grade  to increase tolerance for ADL and work activities.  4. Pt will ambulate 150 ft without AD independently.   5. Pt to tolerate HEP to improve ROM and independence with ADL's  6. Pt will perform SLS for at least 10 sec for increased ease ambulating on uneven surfaces.       PLAN   This patient is discharged from Physical Therapy      Viry Waite PT

## 2023-07-21 DIAGNOSIS — S93.331A SUBLUXATION OF PERONEAL TENDON OF RIGHT FOOT: Primary | ICD-10-CM

## 2023-07-27 ENCOUNTER — OFFICE VISIT (OUTPATIENT)
Dept: ORTHOPEDICS | Facility: CLINIC | Age: 33
End: 2023-07-27
Payer: MEDICAID

## 2023-07-27 ENCOUNTER — HOSPITAL ENCOUNTER (OUTPATIENT)
Dept: RADIOLOGY | Facility: HOSPITAL | Age: 33
Discharge: HOME | End: 2023-07-27
Attending: ORTHOPAEDIC SURGERY
Payer: MEDICAID

## 2023-07-27 DIAGNOSIS — S82.851A CLOSED DISPLACED TRIMALLEOLAR FRACTURE OF RIGHT ANKLE, INITIAL ENCOUNTER: Primary | ICD-10-CM

## 2023-07-27 DIAGNOSIS — S93.331A SUBLUXATION OF PERONEAL TENDON OF RIGHT FOOT: ICD-10-CM

## 2023-07-27 PROCEDURE — 1160F PR REVIEW ALL MEDS BY PRESCRIBER/CLIN PHARMACIST DOCUMENTED: ICD-10-PCS | Mod: CPTII,,, | Performed by: ORTHOPAEDIC SURGERY

## 2023-07-27 PROCEDURE — 1160F RVW MEDS BY RX/DR IN RCRD: CPT | Mod: CPTII,,, | Performed by: ORTHOPAEDIC SURGERY

## 2023-07-27 PROCEDURE — 73610 X-RAY EXAM OF ANKLE: CPT | Mod: TC,PO,RT

## 2023-07-27 PROCEDURE — 1159F PR MEDICATION LIST DOCUMENTED IN MEDICAL RECORD: ICD-10-PCS | Mod: CPTII,,, | Performed by: ORTHOPAEDIC SURGERY

## 2023-07-27 PROCEDURE — 99212 OFFICE O/P EST SF 10 MIN: CPT | Mod: PBBFAC,PN | Performed by: ORTHOPAEDIC SURGERY

## 2023-07-27 PROCEDURE — 1159F MED LIST DOCD IN RCRD: CPT | Mod: CPTII,,, | Performed by: ORTHOPAEDIC SURGERY

## 2023-07-27 PROCEDURE — 99213 OFFICE O/P EST LOW 20 MIN: CPT | Mod: S$PBB,,, | Performed by: ORTHOPAEDIC SURGERY

## 2023-07-27 PROCEDURE — 73610 X-RAY EXAM OF ANKLE: CPT | Mod: 26,RT,, | Performed by: RADIOLOGY

## 2023-07-27 PROCEDURE — 99213 PR OFFICE/OUTPT VISIT, EST, LEVL III, 20-29 MIN: ICD-10-PCS | Mod: S$PBB,,, | Performed by: ORTHOPAEDIC SURGERY

## 2023-07-27 PROCEDURE — 99999 PR PBB SHADOW E&M-EST. PATIENT-LVL II: ICD-10-PCS | Mod: PBBFAC,,, | Performed by: ORTHOPAEDIC SURGERY

## 2023-07-27 PROCEDURE — 73610 XR ANKLE COMPLETE 3 VIEW RIGHT: ICD-10-PCS | Mod: 26,RT,, | Performed by: RADIOLOGY

## 2023-07-27 PROCEDURE — 99999 PR PBB SHADOW E&M-EST. PATIENT-LVL II: CPT | Mod: PBBFAC,,, | Performed by: ORTHOPAEDIC SURGERY

## 2023-07-27 NOTE — PROGRESS NOTES
Status/Diagnosis: Right trimalleolar ankle fracture dislocation.  Right posterolateral ankle myofascial scarring.  Date of Surgery: 03/01/2023  Date of Injury: 02/19/2023  Return visit: 3 months  X-rays on Return: WB 3-views Right ankle      Present History:  Estrellita Garcia is a 32 y.o. female who returns after last being seen 6+ weeks ago.  As previously documented, patient with no evidence of peroneal tendon subluxation.  All popping sensation appears to be myofascial in origin.  Patient has been working with therapy and Jose Francisco Stewart 1-2 times weekly.  Overall patient making improvement.  She does have continued pain at the level of the peroneal retinaculum however this is improved versus last being seen.  Still wearing lace-up ankle brace.  Weightbearing as tolerated.  No new injuries.  Denies any numbness or tingling.  Also states worsening keloid formation involving the medial based surgical scar.    No drainage, fever, chills.    She does have an appointment with her dermatologist next Monday.  Vague history of heart arrhythmia, on atenolol.  Managed by Dr. Holcomb.  Vapes regularly with nicotine.  Works from home.      Past Medical History:   Diagnosis Date    Hypertension     Palpitations        Past Surgical History:   Procedure Laterality Date    APPLICATION OF SPLINT Right 2/23/2023    Procedure: APPLICATION, SPLINT;  Surgeon: Reinier Riddle MD;  Location: [facility] OR;  Service: Orthopedics;  Laterality: Right;    CLOSED REDUCTION Right 2/23/2023    Procedure: CLOSED REDUCTION;  Surgeon: Reinier Riddle MD;  Location: [facility] OR;  Service: Orthopedics;  Laterality: Right;  placement of short leg splint     FIXATION OF SYNDESMOSIS OF ANKLE Right 3/1/2023    Procedure: FIXATION, SYNDESMOSIS, ANKLE;  Surgeon: Reinier Riddle MD;  Location: [facility] OR;  Service: Orthopedics;  Laterality: Right;    OPEN REDUCTION AND INTERNAL FIXATION (ORIF) OF INJURY OF ANKLE Right 3/1/2023    Procedure: ORIF, ANKLE;  Surgeon:  Reinier Riddle MD;  Location: [facility] OR;  Service: Orthopedics;  Laterality: Right;    uterine polyps  2014       Current Outpatient Medications   Medication Sig    acetaminophen (TYLENOL) 325 MG tablet Take 325 mg by mouth every 6 (six) hours as needed for Pain.    ALPRAZolam (XANAX) 0.25 MG tablet Take 0.25 mg by mouth daily as needed.    atenoloL (TENORMIN) 25 MG tablet Take 1 tablet (25 mg total) by mouth once daily. (Patient taking differently: Take 25 mg by mouth every evening.)    dextroamphetamine-amphetamine (ADDERALL) 15 mg tablet Take 15 mg by mouth 2 (two) times daily.    EScitalopram oxalate (LEXAPRO) 20 MG tablet Take 20 mg by mouth every evening. Takes 30mg    ondansetron (ZOFRAN) 8 MG tablet TAKE 1 TABLET(8 MG) BY MOUTH EVERY 12 HOURS AS NEEDED FOR NAUSEA    aspirin (ECOTRIN) 81 MG EC tablet Take 1 tablet (81 mg total) by mouth once daily.     No current facility-administered medications for this visit.       Review of patient's allergies indicates:  No Known Allergies    Family History   Problem Relation Age of Onset    No Known Problems Mother     No Known Problems Father        Social History     Socioeconomic History    Marital status: Single   Tobacco Use    Smoking status: Some Days     Types: Vaping with nicotine    Smokeless tobacco: Never   Substance and Sexual Activity    Alcohol use: Yes     Comment: socially    Drug use: Never    Sexual activity: Yes       Physical exam:  There were no vitals filed for this visit.  There is no height or weight on file to calculate BMI.  General: In no apparent distress; well developed and well nourished.  HEENT: normocephalic; atraumatic.  Cardiovascular: regular rate.  Respiratory: no increased work of breathing.  Musculoskeletal:   Gait: nonantalgic  Inspection:  Medial and lateral based surgical incisions well healed.  Persistent hypertrophic scar formation however no signs or symptoms of infection.  More prominent medially.  Mild residual  posterolateral ankle swelling and pain localized to the superior peroneal retinaculum but with significant improvement.  5/5 strength with resisted foot eversion.  Unable to recreate previously noted myofascial popping over the posterior aspect of the lateral malleolus.  Ankle range of motion from 10° dorsiflexion to 60° plantar flexion.  Gross motor and sensory function intact distally.  Brisk cap refill all 5 digits.                   Imaging Studies/Outside documentation:  WB 3-views Right ankle:   Trimalleolar ankle fracture status post medial and lateral malleolar fracture ORIF and syndesmotic reduction with tight rope.  Overall alignment well maintained.  Fractures appear to be well healed.  Congruent ankle mortise.  No evidence of implant loosening or failure.      Assessment:  Estrellita Garcia is a 32 y.o. female with Right trimalleolar ankle fracture dislocation.  Right posterolateral ankle myofascial scarring.     Plan:   Clinical and radiographic findings were discussed.  As previously noted, fractures are all well healed.  Ankle mortise remains congruent.  Patient has made significant clinical improvement in regard to posterolateral ankle popping that appears to be myofascial in origin based on dynamic ultrasound.  May continue physical therapy and progress activities as previously discussed.  May wean out of brace with therapy also.    Patient and family voiced understanding.  All questions were answered.  Return to clinic in 3 months for repeat evaluation and x-ray.  May be seen sooner should any questions or concerns arise in the interim.        This note was created using voice recognition software and may contain grammatical errors.

## 2023-08-15 PROBLEM — R12 HEART BURN: Status: ACTIVE | Noted: 2023-08-15

## 2023-08-15 PROBLEM — R10.13 EPIGASTRIC PAIN: Status: ACTIVE | Noted: 2023-08-15

## 2023-08-15 PROBLEM — R14.0 BLOATING: Status: ACTIVE | Noted: 2023-08-15

## 2023-08-15 PROBLEM — R19.5 DARK STOOLS: Status: ACTIVE | Noted: 2023-08-15

## 2023-08-15 PROBLEM — K59.09 CHRONIC CONSTIPATION: Status: ACTIVE | Noted: 2023-08-15

## 2023-08-15 PROBLEM — K21.9 GERD (GASTROESOPHAGEAL REFLUX DISEASE): Status: ACTIVE | Noted: 2023-08-15

## 2023-10-24 DIAGNOSIS — S82.851A CLOSED DISPLACED TRIMALLEOLAR FRACTURE OF RIGHT ANKLE, INITIAL ENCOUNTER: Primary | ICD-10-CM

## 2023-10-27 ENCOUNTER — OFFICE VISIT (OUTPATIENT)
Dept: ORTHOPEDICS | Facility: CLINIC | Age: 33
End: 2023-10-27
Payer: MEDICAID

## 2023-10-27 ENCOUNTER — HOSPITAL ENCOUNTER (OUTPATIENT)
Dept: RADIOLOGY | Facility: HOSPITAL | Age: 33
Discharge: HOME | End: 2023-10-27
Attending: ORTHOPAEDIC SURGERY
Payer: MEDICAID

## 2023-10-27 DIAGNOSIS — S93.331A SUBLUXATION OF PERONEAL TENDON OF RIGHT FOOT: ICD-10-CM

## 2023-10-27 DIAGNOSIS — S82.851A CLOSED DISPLACED TRIMALLEOLAR FRACTURE OF RIGHT ANKLE, INITIAL ENCOUNTER: ICD-10-CM

## 2023-10-27 DIAGNOSIS — S82.851A CLOSED DISPLACED TRIMALLEOLAR FRACTURE OF RIGHT ANKLE, INITIAL ENCOUNTER: Primary | ICD-10-CM

## 2023-10-27 PROCEDURE — 1160F RVW MEDS BY RX/DR IN RCRD: CPT | Mod: CPTII,,, | Performed by: ORTHOPAEDIC SURGERY

## 2023-10-27 PROCEDURE — 73610 XR ANKLE COMPLETE 3 VIEW RIGHT: ICD-10-PCS | Mod: 26,RT,, | Performed by: RADIOLOGY

## 2023-10-27 PROCEDURE — 99213 OFFICE O/P EST LOW 20 MIN: CPT | Mod: S$PBB,,, | Performed by: ORTHOPAEDIC SURGERY

## 2023-10-27 PROCEDURE — 1159F MED LIST DOCD IN RCRD: CPT | Mod: CPTII,,, | Performed by: ORTHOPAEDIC SURGERY

## 2023-10-27 PROCEDURE — 1160F PR REVIEW ALL MEDS BY PRESCRIBER/CLIN PHARMACIST DOCUMENTED: ICD-10-PCS | Mod: CPTII,,, | Performed by: ORTHOPAEDIC SURGERY

## 2023-10-27 PROCEDURE — 73610 X-RAY EXAM OF ANKLE: CPT | Mod: 26,RT,, | Performed by: RADIOLOGY

## 2023-10-27 PROCEDURE — 99213 PR OFFICE/OUTPT VISIT, EST, LEVL III, 20-29 MIN: ICD-10-PCS | Mod: S$PBB,,, | Performed by: ORTHOPAEDIC SURGERY

## 2023-10-27 PROCEDURE — 1159F PR MEDICATION LIST DOCUMENTED IN MEDICAL RECORD: ICD-10-PCS | Mod: CPTII,,, | Performed by: ORTHOPAEDIC SURGERY

## 2023-10-27 PROCEDURE — 73610 X-RAY EXAM OF ANKLE: CPT | Mod: TC,PO,RT

## 2023-10-27 NOTE — PROGRESS NOTES
Status/Diagnosis: Right trimalleolar ankle fracture dislocation.  Right posterolateral ankle myofascial scarring.  Date of Surgery: 03/01/2023  Date of Injury: 02/19/2023  Return visit: PRN  X-rays on Return: pending patient complaint      Present History:  Estrellita Garcia is a 33 y.o. female who returns today for routine postop visit now almost 8 months out from surgery.  Overall doing well.  1/10 pain mostly related to increased activity.  She was still participating in physical therapy once weekly with Jose Francisco at [facility]. overall with only mild intermittent symptoms related to posterolateral ankle myofascial scarring/popping.  Still working on peroneal tendon strengthening.    Also with persistent medial > lateral based keloid formation.  Reports she has an appointment with her dermatologist next week.  They did discuss the potential for initiating topical steroid cream.  Currently weight-bearing as tolerated normal shoe wear.  No longer requires brace use.  Vague history of heart arrhythmia, on atenolol.  Managed by Dr. Holcomb.  Vapes regularly with nicotine.  Works from home.      Past Medical History:   Diagnosis Date    Hypertension     Palpitations        Past Surgical History:   Procedure Laterality Date    APPLICATION OF SPLINT Right 2/23/2023    Procedure: APPLICATION, SPLINT;  Surgeon: Reinier Riddle MD;  Location: [facility] OR;  Service: Orthopedics;  Laterality: Right;    CLOSED REDUCTION Right 2/23/2023    Procedure: CLOSED REDUCTION;  Surgeon: Reinier Riddle MD;  Location: [facility] OR;  Service: Orthopedics;  Laterality: Right;  placement of short leg splint     COLONOSCOPY N/A 8/16/2023    Procedure: COLONOSCOPY;  Surgeon: Tracy Sullivan MD;  Location: [facility];  Service: Endoscopy;  Laterality: N/A;    ESOPHAGOGASTRODUODENOSCOPY N/A 8/16/2023    Procedure: EGD (ESOPHAGOGASTRODUODENOSCOPY);  Surgeon: Tracy Sullivan MD;  Location: [facility];  Service: Endoscopy;  Laterality: N/A;    FIXATION OF  SYNDESMOSIS OF ANKLE Right 3/1/2023    Procedure: FIXATION, SYNDESMOSIS, ANKLE;  Surgeon: Reinier Riddle MD;  Location: [facility] OR;  Service: Orthopedics;  Laterality: Right;    OPEN REDUCTION AND INTERNAL FIXATION (ORIF) OF INJURY OF ANKLE Right 3/1/2023    Procedure: ORIF, ANKLE;  Surgeon: Reinier Riddle MD;  Location: [facility] OR;  Service: Orthopedics;  Laterality: Right;    uterine polyps  2014       Current Outpatient Medications   Medication Sig    acetaminophen (TYLENOL) 325 MG tablet Take 325 mg by mouth every 6 (six) hours as needed for Pain.    ALPRAZolam (XANAX) 0.25 MG tablet Take 0.25 mg by mouth daily as needed.    aspirin (ECOTRIN) 81 MG EC tablet Take 1 tablet (81 mg total) by mouth once daily.    atenoloL (TENORMIN) 25 MG tablet Take 1 tablet (25 mg total) by mouth once daily. (Patient taking differently: Take 25 mg by mouth every evening.)    dextroamphetamine-amphetamine (ADDERALL) 15 mg tablet Take 15 mg by mouth 2 (two) times daily.    EScitalopram oxalate (LEXAPRO) 20 MG tablet Take 20 mg by mouth every evening. Takes 30mg    ondansetron (ZOFRAN) 8 MG tablet TAKE 1 TABLET(8 MG) BY MOUTH EVERY 12 HOURS AS NEEDED FOR NAUSEA    pantoprazole (PROTONIX) 20 MG tablet Take 20 mg by mouth once daily.     No current facility-administered medications for this visit.       Review of patient's allergies indicates:  No Known Allergies    Family History   Problem Relation Age of Onset    No Known Problems Mother     No Known Problems Father        Social History     Socioeconomic History    Marital status: Single   Tobacco Use    Smoking status: Some Days     Types: Vaping with nicotine    Smokeless tobacco: Never   Substance and Sexual Activity    Alcohol use: Yes     Comment: socially    Drug use: Never    Sexual activity: Yes       Physical exam:  There were no vitals filed for this visit.  There is no height or weight on file to calculate BMI.  General: In no apparent distress; well developed and well  nourished.  HEENT: normocephalic; atraumatic.  Cardiovascular: regular rate.  Respiratory: no increased work of breathing.  Musculoskeletal:   Gait: nonantalgic  Inspection:  Medial and lateral based surgical incisions well healed.  Persistent hypertrophic scar formation involving the medial > lateral incisions however no signs or symptoms of infection.    No residual posterolateral ankle swelling or pain localized to the superior peroneal retinaculum.  Unable to elicit any popping with resisted foot eversion.  Ankle range of motion from 10° dorsiflexion to 60+ degrees plantar flexion. Gross motor and sensory function intact distally.  Brisk cap refill all 5 digits.                   Imaging Studies/Outside documentation:  WB 3-views Right ankle:   Trimalleolar ankle fracture status post medial and lateral malleolar fracture ORIF and syndesmotic reduction with tight rope.  Overall alignment well maintained.  Fractures are well healed.  Congruent ankle mortise.  No evidence of implant loosening or failure.      Assessment:  Estrellita Garcia is a 33 y.o. female with Right trimalleolar ankle fracture dislocation.  Right posterolateral ankle myofascial scarring.     Plan:   Clinical and radiographic findings were discussed.    Patient has done extremely well postop.  She may continue physical therapy to work on peroneal strengthening treatment of myofascial scarring at her discretion.  Recommend keeping her appointment with the dermatologist to discuss potential treatment for keloid scar formation.    Patient voiced understanding.  All questions were answered.  No restrictions at this time.  She will follow up on an as-needed basis.        This note was created using voice recognition software and may contain grammatical errors.

## 2023-11-02 ENCOUNTER — PATIENT MESSAGE (OUTPATIENT)
Dept: ORTHOPEDICS | Facility: CLINIC | Age: 33
End: 2023-11-02
Payer: MEDICAID

## 2023-11-03 DIAGNOSIS — S93.331A SUBLUXATION OF PERONEAL TENDON OF RIGHT FOOT: Primary | ICD-10-CM

## 2024-02-27 ENCOUNTER — TELEPHONE (OUTPATIENT)
Dept: ORTHOPEDICS | Facility: CLINIC | Age: 34
End: 2024-02-27
Payer: MEDICAID

## 2024-02-27 ENCOUNTER — PATIENT MESSAGE (OUTPATIENT)
Dept: ORTHOPEDICS | Facility: CLINIC | Age: 34
End: 2024-02-27
Payer: MEDICAID

## 2024-02-27 DIAGNOSIS — S82.851A CLOSED DISPLACED TRIMALLEOLAR FRACTURE OF RIGHT ANKLE, INITIAL ENCOUNTER: Primary | ICD-10-CM

## 2024-02-27 NOTE — TELEPHONE ENCOUNTER
"Ankle is still swollen and sore. 2/20 from injury. When she lays down at night she gets a "numbing in R thigh and tingling in R foot."   Per Dr Riddle recommends to get back checked out.  Referral placed  "

## (undated) DEVICE — PENCIL ROCKER SWITCH 10FT CORD

## (undated) DEVICE — DRAPE U SPLIT SHEET 54X76IN

## (undated) DEVICE — BANDAGE ESMARK 6X12

## (undated) DEVICE — DRAPE STERI U-SHAPED 47X51IN

## (undated) DEVICE — BRUSH SCRUB HIBICLENS 4%

## (undated) DEVICE — SUT ETHILON 3-0 PS2 18 BLK

## (undated) DEVICE — APPLICATOR CHLORAPREP ORN 26ML

## (undated) DEVICE — SUT 2-0 VICRYL / SH (J417)

## (undated) DEVICE — STRAP OR TABLE 5IN X 72IN

## (undated) DEVICE — DRAPE C ARM 42 X 120 10/BX

## (undated) DEVICE — SEE MEDLINE ITEM 157216

## (undated) DEVICE — BNDG COFLEX FOAM LF2 ST 6X5YD

## (undated) DEVICE — KIT SAHARA DRAPE DRAW/LIFT

## (undated) DEVICE — IMPLANTABLE DEVICE
Type: IMPLANTABLE DEVICE | Site: ANKLE | Status: NON-FUNCTIONAL
Removed: 2023-03-01

## (undated) DEVICE — K-WIRE TRCR PT1.6MM DIA 150MM
Type: IMPLANTABLE DEVICE | Site: ANKLE | Status: NON-FUNCTIONAL
Removed: 2023-03-01

## (undated) DEVICE — GLOVE BIOGEL PI MICRO SZ 7.5

## (undated) DEVICE — 2.4 DRILLBIT

## (undated) DEVICE — ELECTRODE REM PLYHSV RETURN 9

## (undated) DEVICE — UNDERGLOVES BIOGEL PI SIZE 7.5

## (undated) DEVICE — Device

## (undated) DEVICE — BNDG COFLEX FOAM LF2 ST 4X5YD

## (undated) DEVICE — ALCOHOL 70% ISOP RUBBING 4OZ

## (undated) DEVICE — TUBING SUC UNIV W/CONN 12FT

## (undated) DEVICE — KWIRE SMTH TRCR TIP 1.2X150MM
Type: IMPLANTABLE DEVICE | Site: ANKLE | Status: NON-FUNCTIONAL
Removed: 2023-03-01

## (undated) DEVICE — SUT MONOCRYL 3-0 SH U/D

## (undated) DEVICE — GAUZE SPONGE 4X4 12PLY

## (undated) DEVICE — DRAPE THREE-QTR REINF 53X77IN

## (undated) DEVICE — DRAPE EXTREMITY W/ABC NON-SLIP

## (undated) DEVICE — SUT VICRYL PLUS 0 CT1 36IN